# Patient Record
Sex: FEMALE | Race: WHITE
[De-identification: names, ages, dates, MRNs, and addresses within clinical notes are randomized per-mention and may not be internally consistent; named-entity substitution may affect disease eponyms.]

---

## 2021-03-03 DIAGNOSIS — Z23 NEED FOR VACCINATION: ICD-10-CM

## 2021-09-07 ENCOUNTER — HOSPITAL ENCOUNTER (EMERGENCY)
Dept: HOSPITAL 8 - ED | Age: 66
Discharge: HOME | End: 2021-09-07
Payer: MEDICARE

## 2021-09-07 VITALS — SYSTOLIC BLOOD PRESSURE: 146 MMHG | DIASTOLIC BLOOD PRESSURE: 117 MMHG

## 2021-09-07 VITALS — WEIGHT: 187.39 LBS | BODY MASS INDEX: 28.4 KG/M2 | HEIGHT: 68 IN

## 2021-09-07 DIAGNOSIS — E11.9: ICD-10-CM

## 2021-09-07 DIAGNOSIS — K57.32: Primary | ICD-10-CM

## 2021-09-07 DIAGNOSIS — R10.32: ICD-10-CM

## 2021-09-07 LAB
ALBUMIN SERPL-MCNC: 3 G/DL (ref 3.4–5)
ALP SERPL-CCNC: 101 U/L (ref 45–117)
ALT SERPL-CCNC: 21 U/L (ref 12–78)
ANION GAP SERPL CALC-SCNC: 8 MMOL/L (ref 5–15)
BASOPHILS # BLD AUTO: 0.1 X10^3/UL (ref 0–0.1)
BASOPHILS NFR BLD AUTO: 0 % (ref 0–1)
BILIRUB SERPL-MCNC: 0.6 MG/DL (ref 0.2–1)
CALCIUM SERPL-MCNC: 8.4 MG/DL (ref 8.5–10.1)
CHLORIDE SERPL-SCNC: 104 MMOL/L (ref 98–107)
CREAT SERPL-MCNC: 0.75 MG/DL (ref 0.55–1.02)
EOSINOPHIL # BLD AUTO: 0 X10^3/UL (ref 0–0.4)
EOSINOPHIL NFR BLD AUTO: 0 % (ref 1–7)
ERYTHROCYTE [DISTWIDTH] IN BLOOD BY AUTOMATED COUNT: 13.9 % (ref 9.6–15.2)
LYMPHOCYTES # BLD AUTO: 1.5 X10^3/UL (ref 1–3.4)
LYMPHOCYTES NFR BLD AUTO: 12 % (ref 22–44)
MCH RBC QN AUTO: 27.9 PG (ref 27–34.8)
MCHC RBC AUTO-ENTMCNC: 33.2 G/DL (ref 32.4–35.8)
MONOCYTES # BLD AUTO: 0.8 X10^3/UL (ref 0.2–0.8)
MONOCYTES NFR BLD AUTO: 6 % (ref 2–9)
NEUTROPHILS # BLD AUTO: 10.4 X10^3/UL (ref 1.8–6.8)
NEUTROPHILS NFR BLD AUTO: 82 % (ref 42–75)
PLATELET # BLD AUTO: 230 X10^3/UL (ref 130–400)
PMV BLD AUTO: 7.7 FL (ref 7.4–10.4)
PROT SERPL-MCNC: 7 G/DL (ref 6.4–8.2)
RBC # BLD AUTO: 4.79 X10^6/UL (ref 3.82–5.3)
TROPONIN I SERPL-MCNC: < 0.015 NG/ML (ref 0–0.04)

## 2021-09-07 PROCEDURE — 74177 CT ABD & PELVIS W/CONTRAST: CPT

## 2021-09-07 PROCEDURE — 71045 X-RAY EXAM CHEST 1 VIEW: CPT

## 2021-09-07 PROCEDURE — 80053 COMPREHEN METABOLIC PANEL: CPT

## 2021-09-07 PROCEDURE — 99285 EMERGENCY DEPT VISIT HI MDM: CPT

## 2021-09-07 PROCEDURE — 84484 ASSAY OF TROPONIN QUANT: CPT

## 2021-09-07 PROCEDURE — 82800 BLOOD PH: CPT

## 2021-09-07 PROCEDURE — 96360 HYDRATION IV INFUSION INIT: CPT

## 2021-09-07 PROCEDURE — 93005 ELECTROCARDIOGRAM TRACING: CPT

## 2021-09-07 PROCEDURE — 83690 ASSAY OF LIPASE: CPT

## 2021-09-07 PROCEDURE — 36415 COLL VENOUS BLD VENIPUNCTURE: CPT

## 2021-09-07 PROCEDURE — 85025 COMPLETE CBC W/AUTO DIFF WBC: CPT

## 2021-09-07 NOTE — NUR
PT URINATED IN BEDSIDE COMMODE. WAS TOLD NOT TO PUT TOILET PAPER IN COLLECTION 
CUP.

PT STILL PUT TOILET PAPER IN COLLECTION CUP. COTAMINATED SPECIMEN. WILL TRY 
AGAIN FOR UA SOON. NADN. ATTACHED TO MONITORS. VSS.

## 2021-09-07 NOTE — NUR
Patient/Caregiver given discharge instructions and they have confirmed that 
they understand the instructions.  Patient ambulatory with steady gait. NAD, 
all questions answered appropriately, denies additional needs at this time. No 
personal belongings left in room after discharge. PT PASSED ROADTEST. PT ABLE 
TO WALK WITH STEADY GAIT DOWN HALLWAY. PT GIVEN TAXI VOUCHER FOR SAFE DC

## 2021-09-07 NOTE — NUR
BIBA  FROM HOME. PT STATES SHE FELL GETTING OUT OF BED BECAUSE HER LEGS STOPPED 
WORKING. DENIES LOC, HEAD INJURY AND DOES NOT TAKE BLOOD THINNERS. 





REMSA GAVE 500ML FLUIDS.

ATTACHED TO MONITORS, VSS, NADN.  AT BEDSIDE.

EKG AT BEDSIDE.

## 2023-06-11 ENCOUNTER — APPOINTMENT (OUTPATIENT)
Dept: RADIOLOGY | Facility: MEDICAL CENTER | Age: 68
DRG: 871 | End: 2023-06-11
Attending: EMERGENCY MEDICINE
Payer: MEDICARE

## 2023-06-11 ENCOUNTER — HOSPITAL ENCOUNTER (INPATIENT)
Facility: MEDICAL CENTER | Age: 68
LOS: 5 days | DRG: 871 | End: 2023-06-16
Attending: EMERGENCY MEDICINE | Admitting: STUDENT IN AN ORGANIZED HEALTH CARE EDUCATION/TRAINING PROGRAM
Payer: MEDICARE

## 2023-06-11 ENCOUNTER — APPOINTMENT (OUTPATIENT)
Dept: RADIOLOGY | Facility: MEDICAL CENTER | Age: 68
DRG: 871 | End: 2023-06-11
Attending: STUDENT IN AN ORGANIZED HEALTH CARE EDUCATION/TRAINING PROGRAM
Payer: MEDICARE

## 2023-06-11 DIAGNOSIS — N17.9 AKI (ACUTE KIDNEY INJURY) (HCC): ICD-10-CM

## 2023-06-11 DIAGNOSIS — J18.9 PNEUMONIA OF BOTH LUNGS DUE TO INFECTIOUS ORGANISM, UNSPECIFIED PART OF LUNG: ICD-10-CM

## 2023-06-11 DIAGNOSIS — Z79.4 TYPE 2 DIABETES MELLITUS WITH HYPERGLYCEMIA, WITH LONG-TERM CURRENT USE OF INSULIN (HCC): ICD-10-CM

## 2023-06-11 DIAGNOSIS — A41.9 SEPSIS, DUE TO UNSPECIFIED ORGANISM, UNSPECIFIED WHETHER ACUTE ORGAN DYSFUNCTION PRESENT (HCC): ICD-10-CM

## 2023-06-11 DIAGNOSIS — R09.02 HYPOXIA: ICD-10-CM

## 2023-06-11 DIAGNOSIS — N39.0 ACUTE UTI: ICD-10-CM

## 2023-06-11 DIAGNOSIS — E11.65 TYPE 2 DIABETES MELLITUS WITH HYPERGLYCEMIA, WITH LONG-TERM CURRENT USE OF INSULIN (HCC): ICD-10-CM

## 2023-06-11 DIAGNOSIS — J18.9 PNEUMONIA OF LEFT LUNG DUE TO INFECTIOUS ORGANISM, UNSPECIFIED PART OF LUNG: ICD-10-CM

## 2023-06-11 PROBLEM — E11.9 DM (DIABETES MELLITUS) (HCC): Status: ACTIVE | Noted: 2023-06-11

## 2023-06-11 PROBLEM — Z71.89 ACP (ADVANCE CARE PLANNING): Status: ACTIVE | Noted: 2023-06-11

## 2023-06-11 PROBLEM — R65.20 SEVERE SEPSIS (HCC): Status: ACTIVE | Noted: 2023-06-11

## 2023-06-11 LAB
ALBUMIN SERPL BCP-MCNC: 2.9 G/DL (ref 3.2–4.9)
ALBUMIN/GLOB SERPL: 0.8 G/DL
ALP SERPL-CCNC: 88 U/L (ref 30–99)
ALT SERPL-CCNC: 6 U/L (ref 2–50)
ANION GAP SERPL CALC-SCNC: 17 MMOL/L (ref 7–16)
APPEARANCE UR: ABNORMAL
AST SERPL-CCNC: 6 U/L (ref 12–45)
BACTERIA #/AREA URNS HPF: ABNORMAL /HPF
BASOPHILS # BLD AUTO: 0.3 % (ref 0–1.8)
BASOPHILS # BLD: 0.07 K/UL (ref 0–0.12)
BILIRUB SERPL-MCNC: 0.3 MG/DL (ref 0.1–1.5)
BILIRUB UR QL STRIP.AUTO: ABNORMAL
BUN SERPL-MCNC: 46 MG/DL (ref 8–22)
CALCIUM ALBUM COR SERPL-MCNC: 9.5 MG/DL (ref 8.5–10.5)
CALCIUM SERPL-MCNC: 8.6 MG/DL (ref 8.5–10.5)
CHLORIDE SERPL-SCNC: 95 MMOL/L (ref 96–112)
CO2 SERPL-SCNC: 16 MMOL/L (ref 20–33)
COLOR UR: YELLOW
CREAT SERPL-MCNC: 2.91 MG/DL (ref 0.5–1.4)
EOSINOPHIL # BLD AUTO: 0.37 K/UL (ref 0–0.51)
EOSINOPHIL NFR BLD: 1.6 % (ref 0–6.9)
EPI CELLS #/AREA URNS HPF: NEGATIVE /HPF
ERYTHROCYTE [DISTWIDTH] IN BLOOD BY AUTOMATED COUNT: 46.7 FL (ref 35.9–50)
GFR SERPLBLD CREATININE-BSD FMLA CKD-EPI: 17 ML/MIN/1.73 M 2
GLOBULIN SER CALC-MCNC: 3.7 G/DL (ref 1.9–3.5)
GLUCOSE BLD STRIP.AUTO-MCNC: 163 MG/DL (ref 65–99)
GLUCOSE SERPL-MCNC: 193 MG/DL (ref 65–99)
GLUCOSE UR STRIP.AUTO-MCNC: NEGATIVE MG/DL
HCT VFR BLD AUTO: 26.7 % (ref 37–47)
HGB BLD-MCNC: 8.9 G/DL (ref 12–16)
HYALINE CASTS #/AREA URNS LPF: ABNORMAL /LPF
IMM GRANULOCYTES # BLD AUTO: 0.28 K/UL (ref 0–0.11)
IMM GRANULOCYTES NFR BLD AUTO: 1.2 % (ref 0–0.9)
KETONES UR STRIP.AUTO-MCNC: ABNORMAL MG/DL
LACTATE SERPL-SCNC: 1.7 MMOL/L (ref 0.5–2)
LEUKOCYTE ESTERASE UR QL STRIP.AUTO: ABNORMAL
LYMPHOCYTES # BLD AUTO: 2.49 K/UL (ref 1–4.8)
LYMPHOCYTES NFR BLD: 10.7 % (ref 22–41)
MCH RBC QN AUTO: 26.8 PG (ref 27–33)
MCHC RBC AUTO-ENTMCNC: 33.3 G/DL (ref 32.2–35.5)
MCV RBC AUTO: 80.4 FL (ref 81.4–97.8)
MICRO URNS: ABNORMAL
MONOCYTES # BLD AUTO: 1.08 K/UL (ref 0–0.85)
MONOCYTES NFR BLD AUTO: 4.7 % (ref 0–13.4)
NEUTROPHILS # BLD AUTO: 18.93 K/UL (ref 1.82–7.42)
NEUTROPHILS NFR BLD: 81.5 % (ref 44–72)
NITRITE UR QL STRIP.AUTO: NEGATIVE
NRBC # BLD AUTO: 0 K/UL
NRBC BLD-RTO: 0 /100 WBC (ref 0–0.2)
PH UR STRIP.AUTO: 6 [PH] (ref 5–8)
PLATELET # BLD AUTO: 474 K/UL (ref 164–446)
PMV BLD AUTO: 8.4 FL (ref 9–12.9)
POTASSIUM SERPL-SCNC: 4.5 MMOL/L (ref 3.6–5.5)
PROT SERPL-MCNC: 6.6 G/DL (ref 6–8.2)
PROT UR QL STRIP: 100 MG/DL
RBC # BLD AUTO: 3.32 M/UL (ref 4.2–5.4)
RBC # URNS HPF: ABNORMAL /HPF
RBC UR QL AUTO: ABNORMAL
SODIUM SERPL-SCNC: 128 MMOL/L (ref 135–145)
SP GR UR STRIP.AUTO: 1.02
UROBILINOGEN UR STRIP.AUTO-MCNC: 0.2 MG/DL
WBC # BLD AUTO: 23.2 K/UL (ref 4.8–10.8)
WBC #/AREA URNS HPF: ABNORMAL /HPF
YEAST BUDDING URNS QL: PRESENT /HPF

## 2023-06-11 PROCEDURE — 81001 URINALYSIS AUTO W/SCOPE: CPT

## 2023-06-11 PROCEDURE — 99223 1ST HOSP IP/OBS HIGH 75: CPT | Mod: AI,25 | Performed by: STUDENT IN AN ORGANIZED HEALTH CARE EDUCATION/TRAINING PROGRAM

## 2023-06-11 PROCEDURE — 700111 HCHG RX REV CODE 636 W/ 250 OVERRIDE (IP): Performed by: STUDENT IN AN ORGANIZED HEALTH CARE EDUCATION/TRAINING PROGRAM

## 2023-06-11 PROCEDURE — 770000 HCHG ROOM/CARE - INTERMEDIATE ICU *

## 2023-06-11 PROCEDURE — 82962 GLUCOSE BLOOD TEST: CPT

## 2023-06-11 PROCEDURE — 70450 CT HEAD/BRAIN W/O DYE: CPT

## 2023-06-11 PROCEDURE — 700105 HCHG RX REV CODE 258: Performed by: EMERGENCY MEDICINE

## 2023-06-11 PROCEDURE — 96374 THER/PROPH/DIAG INJ IV PUSH: CPT

## 2023-06-11 PROCEDURE — 71045 X-RAY EXAM CHEST 1 VIEW: CPT

## 2023-06-11 PROCEDURE — 80053 COMPREHEN METABOLIC PANEL: CPT

## 2023-06-11 PROCEDURE — 700111 HCHG RX REV CODE 636 W/ 250 OVERRIDE (IP): Performed by: EMERGENCY MEDICINE

## 2023-06-11 PROCEDURE — 87040 BLOOD CULTURE FOR BACTERIA: CPT | Mod: 91

## 2023-06-11 PROCEDURE — 99285 EMERGENCY DEPT VISIT HI MDM: CPT

## 2023-06-11 PROCEDURE — 85025 COMPLETE CBC W/AUTO DIFF WBC: CPT

## 2023-06-11 PROCEDURE — 83605 ASSAY OF LACTIC ACID: CPT

## 2023-06-11 PROCEDURE — 99497 ADVNCD CARE PLAN 30 MIN: CPT | Mod: 25 | Performed by: STUDENT IN AN ORGANIZED HEALTH CARE EDUCATION/TRAINING PROGRAM

## 2023-06-11 PROCEDURE — 87086 URINE CULTURE/COLONY COUNT: CPT

## 2023-06-11 PROCEDURE — 36415 COLL VENOUS BLD VENIPUNCTURE: CPT

## 2023-06-11 RX ORDER — LOSARTAN POTASSIUM 100 MG/1
100 TABLET ORAL DAILY
Status: ON HOLD | COMMUNITY
End: 2023-06-16

## 2023-06-11 RX ORDER — SENNOSIDES A AND B 8.6 MG/1
8.6 TABLET, FILM COATED ORAL 2 TIMES DAILY
COMMUNITY

## 2023-06-11 RX ORDER — SODIUM CHLORIDE 9 MG/ML
INJECTION, SOLUTION INTRAVENOUS CONTINUOUS
Status: DISCONTINUED | OUTPATIENT
Start: 2023-06-11 | End: 2023-06-11

## 2023-06-11 RX ORDER — BUPROPION HYDROCHLORIDE 75 MG/1
150 TABLET ORAL DAILY
Status: ON HOLD | COMMUNITY
End: 2023-06-16

## 2023-06-11 RX ORDER — LANOLIN ALCOHOL/MO/W.PET/CERES
3 CREAM (GRAM) TOPICAL
COMMUNITY

## 2023-06-11 RX ORDER — INSULIN LISPRO 100 [IU]/ML
0.2 INJECTION, SOLUTION INTRAVENOUS; SUBCUTANEOUS
Status: DISCONTINUED | OUTPATIENT
Start: 2023-06-12 | End: 2023-06-12

## 2023-06-11 RX ORDER — CEFTRIAXONE 2 G/1
2000 INJECTION, POWDER, FOR SOLUTION INTRAMUSCULAR; INTRAVENOUS ONCE
Status: COMPLETED | OUTPATIENT
Start: 2023-06-11 | End: 2023-06-11

## 2023-06-11 RX ORDER — INSULIN GLARGINE 100 [IU]/ML
28 INJECTION, SOLUTION SUBCUTANEOUS DAILY
Status: ON HOLD | COMMUNITY
End: 2023-06-16 | Stop reason: SDUPTHER

## 2023-06-11 RX ORDER — AMLODIPINE BESYLATE 5 MG/1
5 TABLET ORAL DAILY
COMMUNITY

## 2023-06-11 RX ORDER — OMEPRAZOLE 20 MG/1
20 CAPSULE, DELAYED RELEASE ORAL DAILY
COMMUNITY

## 2023-06-11 RX ORDER — HEPARIN SODIUM 5000 [USP'U]/ML
5000 INJECTION, SOLUTION INTRAVENOUS; SUBCUTANEOUS EVERY 8 HOURS
COMMUNITY

## 2023-06-11 RX ORDER — SULFAMETHOXAZOLE AND TRIMETHOPRIM 800; 160 MG/1; MG/1
1 TABLET ORAL 2 TIMES DAILY
Status: ON HOLD | COMMUNITY
End: 2023-06-16

## 2023-06-11 RX ORDER — SODIUM CHLORIDE, SODIUM LACTATE, POTASSIUM CHLORIDE, AND CALCIUM CHLORIDE .6; .31; .03; .02 G/100ML; G/100ML; G/100ML; G/100ML
30 INJECTION, SOLUTION INTRAVENOUS ONCE
Status: COMPLETED | OUTPATIENT
Start: 2023-06-11 | End: 2023-06-11

## 2023-06-11 RX ORDER — ACETAMINOPHEN 325 MG/1
650 TABLET ORAL EVERY 6 HOURS PRN
Status: DISCONTINUED | OUTPATIENT
Start: 2023-06-11 | End: 2023-06-16 | Stop reason: HOSPADM

## 2023-06-11 RX ORDER — ENOXAPARIN SODIUM 100 MG/ML
40 INJECTION SUBCUTANEOUS DAILY
Status: DISCONTINUED | OUTPATIENT
Start: 2023-06-11 | End: 2023-06-11

## 2023-06-11 RX ORDER — HEPARIN SODIUM 5000 [USP'U]/ML
5000 INJECTION, SOLUTION INTRAVENOUS; SUBCUTANEOUS EVERY 8 HOURS
Status: DISCONTINUED | OUTPATIENT
Start: 2023-06-11 | End: 2023-06-13

## 2023-06-11 RX ORDER — FUROSEMIDE 10 MG/ML
20 INJECTION INTRAMUSCULAR; INTRAVENOUS ONCE
Status: COMPLETED | OUTPATIENT
Start: 2023-06-11 | End: 2023-06-11

## 2023-06-11 RX ORDER — CEFDINIR 300 MG/1
300 CAPSULE ORAL 2 TIMES DAILY
Status: ON HOLD | COMMUNITY
End: 2023-06-16

## 2023-06-11 RX ORDER — INSULIN LISPRO 100 [IU]/ML
1-6 INJECTION, SOLUTION INTRAVENOUS; SUBCUTANEOUS
Status: DISCONTINUED | OUTPATIENT
Start: 2023-06-12 | End: 2023-06-12

## 2023-06-11 RX ORDER — UREA 10 %
3 LOTION (ML) TOPICAL
Status: DISCONTINUED | OUTPATIENT
Start: 2023-06-11 | End: 2023-06-16 | Stop reason: HOSPADM

## 2023-06-11 RX ORDER — INSULIN ASPART 100 [IU]/ML
2-12 INJECTION, SOLUTION INTRAVENOUS; SUBCUTANEOUS
COMMUNITY

## 2023-06-11 RX ORDER — BUPROPION HYDROCHLORIDE 75 MG/1
150 TABLET ORAL DAILY
Status: DISCONTINUED | OUTPATIENT
Start: 2023-06-12 | End: 2023-06-16 | Stop reason: HOSPADM

## 2023-06-11 RX ORDER — ACETAMINOPHEN 325 MG/1
650 TABLET ORAL EVERY 4 HOURS PRN
COMMUNITY

## 2023-06-11 RX ADMIN — SODIUM CHLORIDE, POTASSIUM CHLORIDE, SODIUM LACTATE AND CALCIUM CHLORIDE 2517 ML: 600; 310; 30; 20 INJECTION, SOLUTION INTRAVENOUS at 18:07

## 2023-06-11 RX ADMIN — FUROSEMIDE 20 MG: 10 INJECTION, SOLUTION INTRAVENOUS at 22:08

## 2023-06-11 RX ADMIN — HEPARIN SODIUM 5000 UNITS: 5000 INJECTION, SOLUTION INTRAVENOUS; SUBCUTANEOUS at 21:48

## 2023-06-11 RX ADMIN — CEFTRIAXONE SODIUM 2000 MG: 2 INJECTION, POWDER, FOR SOLUTION INTRAMUSCULAR; INTRAVENOUS at 18:15

## 2023-06-11 ASSESSMENT — PAIN DESCRIPTION - PAIN TYPE: TYPE: ACUTE PAIN

## 2023-06-11 ASSESSMENT — COGNITIVE AND FUNCTIONAL STATUS - GENERAL
TOILETING: A LITTLE
MOVING TO AND FROM BED TO CHAIR: A LOT
HELP NEEDED FOR BATHING: A LITTLE
DRESSING REGULAR LOWER BODY CLOTHING: A LITTLE
DAILY ACTIVITIY SCORE: 20
STANDING UP FROM CHAIR USING ARMS: A LOT
CLIMB 3 TO 5 STEPS WITH RAILING: A LOT
SUGGESTED CMS G CODE MODIFIER DAILY ACTIVITY: CJ
MOBILITY SCORE: 14
MOVING FROM LYING ON BACK TO SITTING ON SIDE OF FLAT BED: A LITTLE
DRESSING REGULAR UPPER BODY CLOTHING: A LITTLE
WALKING IN HOSPITAL ROOM: A LOT
SUGGESTED CMS G CODE MODIFIER MOBILITY: CL
TURNING FROM BACK TO SIDE WHILE IN FLAT BAD: A LITTLE

## 2023-06-11 ASSESSMENT — LIFESTYLE VARIABLES
EVER HAD A DRINK FIRST THING IN THE MORNING TO STEADY YOUR NERVES TO GET RID OF A HANGOVER: NO
HOW MANY TIMES IN THE PAST YEAR HAVE YOU HAD 5 OR MORE DRINKS IN A DAY: 0
TOTAL SCORE: 0
CONSUMPTION TOTAL: NEGATIVE
HAVE PEOPLE ANNOYED YOU BY CRITICIZING YOUR DRINKING: NO
ALCOHOL_USE: NO
AVERAGE NUMBER OF DAYS PER WEEK YOU HAVE A DRINK CONTAINING ALCOHOL: 0
ON A TYPICAL DAY WHEN YOU DRINK ALCOHOL HOW MANY DRINKS DO YOU HAVE: 0
DOES PATIENT WANT TO STOP DRINKING: NO
HAVE YOU EVER FELT YOU SHOULD CUT DOWN ON YOUR DRINKING: NO
EVER FELT BAD OR GUILTY ABOUT YOUR DRINKING: NO

## 2023-06-11 ASSESSMENT — ENCOUNTER SYMPTOMS
NEUROLOGICAL NEGATIVE: 1
RESPIRATORY NEGATIVE: 1
CHILLS: 1
EYES NEGATIVE: 1
PSYCHIATRIC NEGATIVE: 1
CARDIOVASCULAR NEGATIVE: 1
MUSCULOSKELETAL NEGATIVE: 1

## 2023-06-11 ASSESSMENT — PATIENT HEALTH QUESTIONNAIRE - PHQ9
SUM OF ALL RESPONSES TO PHQ9 QUESTIONS 1 AND 2: 0
1. LITTLE INTEREST OR PLEASURE IN DOING THINGS: NOT AT ALL
2. FEELING DOWN, DEPRESSED, IRRITABLE, OR HOPELESS: NOT AT ALL

## 2023-06-11 ASSESSMENT — FIBROSIS 4 INDEX
FIB4 SCORE: 0.67
FIB4 SCORE: 0.35

## 2023-06-12 ENCOUNTER — APPOINTMENT (OUTPATIENT)
Dept: CARDIOLOGY | Facility: MEDICAL CENTER | Age: 68
DRG: 871 | End: 2023-06-12
Attending: INTERNAL MEDICINE
Payer: MEDICARE

## 2023-06-12 PROBLEM — J96.01 ACUTE RESPIRATORY FAILURE WITH HYPOXIA (HCC): Status: ACTIVE | Noted: 2023-06-12

## 2023-06-12 LAB
ANION GAP SERPL CALC-SCNC: 16 MMOL/L (ref 7–16)
BASOPHILS # BLD AUTO: 0.3 % (ref 0–1.8)
BASOPHILS # BLD: 0.05 K/UL (ref 0–0.12)
BUN SERPL-MCNC: 36 MG/DL (ref 8–22)
CALCIUM SERPL-MCNC: 8.6 MG/DL (ref 8.5–10.5)
CHLORIDE SERPL-SCNC: 102 MMOL/L (ref 96–112)
CO2 SERPL-SCNC: 18 MMOL/L (ref 20–33)
CREAT SERPL-MCNC: 1.77 MG/DL (ref 0.5–1.4)
EOSINOPHIL # BLD AUTO: 0.39 K/UL (ref 0–0.51)
EOSINOPHIL NFR BLD: 2 % (ref 0–6.9)
ERYTHROCYTE [DISTWIDTH] IN BLOOD BY AUTOMATED COUNT: 48.2 FL (ref 35.9–50)
GFR SERPLBLD CREATININE-BSD FMLA CKD-EPI: 31 ML/MIN/1.73 M 2
GLUCOSE BLD STRIP.AUTO-MCNC: 170 MG/DL (ref 65–99)
GLUCOSE BLD STRIP.AUTO-MCNC: 216 MG/DL (ref 65–99)
GLUCOSE BLD STRIP.AUTO-MCNC: 229 MG/DL (ref 65–99)
GLUCOSE SERPL-MCNC: 179 MG/DL (ref 65–99)
HCT VFR BLD AUTO: 25.9 % (ref 37–47)
HGB BLD-MCNC: 8.3 G/DL (ref 12–16)
IMM GRANULOCYTES # BLD AUTO: 0.29 K/UL (ref 0–0.11)
IMM GRANULOCYTES NFR BLD AUTO: 1.5 % (ref 0–0.9)
LV EJECT FRACT  99904: 70
LV EJECT FRACT MOD 2C 99903: 67.98
LV EJECT FRACT MOD 4C 99902: 68.26
LV EJECT FRACT MOD BP 99901: 67.57
LYMPHOCYTES # BLD AUTO: 1.86 K/UL (ref 1–4.8)
LYMPHOCYTES NFR BLD: 9.4 % (ref 22–41)
MCH RBC QN AUTO: 26.6 PG (ref 27–33)
MCHC RBC AUTO-ENTMCNC: 32 G/DL (ref 32.2–35.5)
MCV RBC AUTO: 83 FL (ref 81.4–97.8)
MONOCYTES # BLD AUTO: 1 K/UL (ref 0–0.85)
MONOCYTES NFR BLD AUTO: 5.1 % (ref 0–13.4)
NEUTROPHILS # BLD AUTO: 16.19 K/UL (ref 1.82–7.42)
NEUTROPHILS NFR BLD: 81.7 % (ref 44–72)
NRBC # BLD AUTO: 0 K/UL
NRBC BLD-RTO: 0 /100 WBC (ref 0–0.2)
PLATELET # BLD AUTO: 470 K/UL (ref 164–446)
PMV BLD AUTO: 8.8 FL (ref 9–12.9)
POTASSIUM SERPL-SCNC: 4.3 MMOL/L (ref 3.6–5.5)
RBC # BLD AUTO: 3.12 M/UL (ref 4.2–5.4)
SODIUM SERPL-SCNC: 136 MMOL/L (ref 135–145)
WBC # BLD AUTO: 19.8 K/UL (ref 4.8–10.8)

## 2023-06-12 PROCEDURE — 700111 HCHG RX REV CODE 636 W/ 250 OVERRIDE (IP): Performed by: STUDENT IN AN ORGANIZED HEALTH CARE EDUCATION/TRAINING PROGRAM

## 2023-06-12 PROCEDURE — 97162 PT EVAL MOD COMPLEX 30 MIN: CPT

## 2023-06-12 PROCEDURE — 700105 HCHG RX REV CODE 258: Performed by: INTERNAL MEDICINE

## 2023-06-12 PROCEDURE — 85025 COMPLETE CBC W/AUTO DIFF WBC: CPT

## 2023-06-12 PROCEDURE — 700111 HCHG RX REV CODE 636 W/ 250 OVERRIDE (IP): Performed by: INTERNAL MEDICINE

## 2023-06-12 PROCEDURE — A9270 NON-COVERED ITEM OR SERVICE: HCPCS | Performed by: STUDENT IN AN ORGANIZED HEALTH CARE EDUCATION/TRAINING PROGRAM

## 2023-06-12 PROCEDURE — 700102 HCHG RX REV CODE 250 W/ 637 OVERRIDE(OP): Performed by: INTERNAL MEDICINE

## 2023-06-12 PROCEDURE — 307059 PAD,EAR PROTECTOR: Performed by: INTERNAL MEDICINE

## 2023-06-12 PROCEDURE — 92612 ENDOSCOPY SWALLOW (FEES) VID: CPT

## 2023-06-12 PROCEDURE — 700102 HCHG RX REV CODE 250 W/ 637 OVERRIDE(OP): Performed by: STUDENT IN AN ORGANIZED HEALTH CARE EDUCATION/TRAINING PROGRAM

## 2023-06-12 PROCEDURE — 770000 HCHG ROOM/CARE - INTERMEDIATE ICU *

## 2023-06-12 PROCEDURE — 99233 SBSQ HOSP IP/OBS HIGH 50: CPT | Performed by: INTERNAL MEDICINE

## 2023-06-12 PROCEDURE — 93306 TTE W/DOPPLER COMPLETE: CPT

## 2023-06-12 PROCEDURE — 92610 EVALUATE SWALLOWING FUNCTION: CPT

## 2023-06-12 PROCEDURE — 82962 GLUCOSE BLOOD TEST: CPT | Mod: 91

## 2023-06-12 PROCEDURE — 80048 BASIC METABOLIC PNL TOTAL CA: CPT

## 2023-06-12 PROCEDURE — 93306 TTE W/DOPPLER COMPLETE: CPT | Mod: 26 | Performed by: INTERNAL MEDICINE

## 2023-06-12 RX ORDER — ONDANSETRON 4 MG/1
4 TABLET, ORALLY DISINTEGRATING ORAL EVERY 4 HOURS PRN
Status: DISCONTINUED | OUTPATIENT
Start: 2023-06-12 | End: 2023-06-16 | Stop reason: HOSPADM

## 2023-06-12 RX ORDER — INSULIN LISPRO 100 [IU]/ML
1-6 INJECTION, SOLUTION INTRAVENOUS; SUBCUTANEOUS
Status: DISCONTINUED | OUTPATIENT
Start: 2023-06-12 | End: 2023-06-16 | Stop reason: HOSPADM

## 2023-06-12 RX ORDER — ONDANSETRON 2 MG/ML
4 INJECTION INTRAMUSCULAR; INTRAVENOUS EVERY 4 HOURS PRN
Status: DISCONTINUED | OUTPATIENT
Start: 2023-06-12 | End: 2023-06-16 | Stop reason: HOSPADM

## 2023-06-12 RX ORDER — INSULIN LISPRO 100 [IU]/ML
0.2 INJECTION, SOLUTION INTRAVENOUS; SUBCUTANEOUS
Status: DISCONTINUED | OUTPATIENT
Start: 2023-06-12 | End: 2023-06-16 | Stop reason: HOSPADM

## 2023-06-12 RX ADMIN — HEPARIN SODIUM 5000 UNITS: 5000 INJECTION, SOLUTION INTRAVENOUS; SUBCUTANEOUS at 21:06

## 2023-06-12 RX ADMIN — INSULIN GLARGINE-YFGN 12 UNITS: 100 INJECTION, SOLUTION SUBCUTANEOUS at 18:38

## 2023-06-12 RX ADMIN — INSULIN LISPRO 2 UNITS: 100 INJECTION, SOLUTION INTRAVENOUS; SUBCUTANEOUS at 18:40

## 2023-06-12 RX ADMIN — INSULIN LISPRO 6 UNITS: 100 INJECTION, SOLUTION INTRAVENOUS; SUBCUTANEOUS at 18:37

## 2023-06-12 RX ADMIN — HEPARIN SODIUM 5000 UNITS: 5000 INJECTION, SOLUTION INTRAVENOUS; SUBCUTANEOUS at 14:43

## 2023-06-12 RX ADMIN — Medication 3 MG: at 21:05

## 2023-06-12 RX ADMIN — AMPICILLIN AND SULBACTAM 3 G: 1; 2 INJECTION, POWDER, FOR SOLUTION INTRAMUSCULAR; INTRAVENOUS at 23:57

## 2023-06-12 RX ADMIN — ACETAMINOPHEN 650 MG: 325 TABLET, FILM COATED ORAL at 21:10

## 2023-06-12 RX ADMIN — AMPICILLIN AND SULBACTAM 3 G: 1; 2 INJECTION, POWDER, FOR SOLUTION INTRAMUSCULAR; INTRAVENOUS at 18:34

## 2023-06-12 RX ADMIN — ONDANSETRON 4 MG: 2 INJECTION INTRAMUSCULAR; INTRAVENOUS at 05:06

## 2023-06-12 RX ADMIN — HEPARIN SODIUM 5000 UNITS: 5000 INJECTION, SOLUTION INTRAVENOUS; SUBCUTANEOUS at 05:06

## 2023-06-12 ASSESSMENT — PAIN DESCRIPTION - PAIN TYPE
TYPE: ACUTE PAIN

## 2023-06-12 ASSESSMENT — COGNITIVE AND FUNCTIONAL STATUS - GENERAL
WALKING IN HOSPITAL ROOM: A LOT
MOVING TO AND FROM BED TO CHAIR: UNABLE
MOVING FROM LYING ON BACK TO SITTING ON SIDE OF FLAT BED: UNABLE
TURNING FROM BACK TO SIDE WHILE IN FLAT BAD: A LOT
CLIMB 3 TO 5 STEPS WITH RAILING: TOTAL
MOBILITY SCORE: 9
STANDING UP FROM CHAIR USING ARMS: A LOT
SUGGESTED CMS G CODE MODIFIER MOBILITY: CM

## 2023-06-12 ASSESSMENT — ENCOUNTER SYMPTOMS
NAUSEA: 0
FOCAL WEAKNESS: 0
MYALGIAS: 0
PHOTOPHOBIA: 0
DIZZINESS: 0
DOUBLE VISION: 0
TREMORS: 0
SHORTNESS OF BREATH: 1
HALLUCINATIONS: 0
SPUTUM PRODUCTION: 1
SPEECH CHANGE: 0
VOMITING: 0
PALPITATIONS: 0
COUGH: 1
ORTHOPNEA: 0
ABDOMINAL PAIN: 0
CHILLS: 0
EYE PAIN: 0
FEVER: 0
DIARRHEA: 0
WEIGHT LOSS: 0
NECK PAIN: 0
BLURRED VISION: 0
CONSTIPATION: 0
HEADACHES: 0
BACK PAIN: 0
TINGLING: 0
SENSORY CHANGE: 0

## 2023-06-12 ASSESSMENT — GAIT ASSESSMENTS: GAIT LEVEL OF ASSIST: UNABLE TO PARTICIPATE

## 2023-06-12 ASSESSMENT — LIFESTYLE VARIABLES: SUBSTANCE_ABUSE: 0

## 2023-06-12 NOTE — ASSESSMENT & PLAN NOTE
Secondary to pneumonia  Supplemental oxygen at 6 liters to maintain adequate sats.  Echocardiogram is normal.

## 2023-06-12 NOTE — PROGRESS NOTES
This patient will be admitted to the [facility] for severe sepsis and hypoxia and remains under the care of hospitalist (who all questions and concerns should be relayed to).  While a critical care consultation has not been requested, we are immediately available if the patient requires critical care in the future.

## 2023-06-12 NOTE — ED NOTES
Bedside report from Faye BUTCHER.  Pt back from CT, connected to VS monitor and wall oxygen at 4L NC.

## 2023-06-12 NOTE — ED NOTES
Pt removed NRB mask x2, saturation dropped to 84%. NRB mask placed back on pt, saturation improved to 94%. Pt verbalized understanding that oxygen and monitoring equipment must stay in place for pt's safety.

## 2023-06-12 NOTE — WOUND TEAM
In to see pt for almost resolved incision to R medial foot and ankle. It is approximated with 0.4 x 0.5 adherent scab. With assistance from Nsg pt stood from chair using FWW so that sacrococcygeal area could be assessed. She has red buttocks that mel. Redness is also proximal on sacrococcygeal area. It is blanching. Discussed with RN having pt side to side mostly with minimal time supine since pt is incontinent and unable to use sacral adhesive foam for protection. Barrier paste to be in use to protect skin. If pt unable to state when she has to urinate consider using Purewick. Per RN it was tried last night and was unsuccessful.       INTERVENTIONS  CURRENTLY IN PLACE (X), APPLIED THIS VISIT (A), ORDERED (O):   Q shift Jovani:  X  Q shift pressure point assessments:  X    Surface/Positioning   Standard Mattress/Trauma Bed           Low Airloss          ICU Low Airloss x  Bariatric MERCEDES     Waffle cushion        Waffle Overlay          Reposition q 2 hours   x   TAPs Turning system     Z Del Pillow     Offloading/Redistribution   Sacral Offloading Dressing (Silicone dressing)     Heel Offloading Dressing (Silicone dressing)    o     Heel float boots (Prevalon boot)             Float Heels off Bed with Pillows           Respiratory  Oxymask  Silicone O2 tubing         Gray Foam Ear protectors   o  Cannula fixation Device (Tender )          High flow offloading Clip    Elastic head band offloading device      Anchorfast                                                         Trach with Optifoam split foam             Containment/Moisture Prevention     Rectal tube or BMS    Purwick/Condom Cath        Daniels Catheter    Barrier wipes    x       Barrier paste    x   Antifungal tx      Interdry        Mobilization       Up to chair    x    Ambulate      PT/OT  x    Nutrition       Dietician        Diabetes Education      PO  x   TF     TPN     NPO   # days     Other  mildly  thick, minced    Anticipated discharge  plans: TBD was at [facility]  LTACH:        SNF/Rehab:                  Home Health Care:           Outpatient Wound Center:            Self/Family Care:        Other:

## 2023-06-12 NOTE — CARE PLAN
The patient is Watcher - Medium risk of patient condition declining or worsening    Shift Goals  Clinical Goals: decrease O2 demands, improve breathing  Patient Goals: kendrick  Family Goals: kendrick    Progress made toward(s) clinical / shift goals:    Problem: Knowledge Deficit - Standard  Goal: Patient and family/care givers will demonstrate understanding of plan of care, disease process/condition, diagnostic tests and medications  Outcome: Progressing     Problem: Fluid Volume  Goal: Fluid volume balance will be maintained  Outcome: Progressing     Problem: Skin Integrity  Goal: Skin integrity is maintained or improved  Outcome: Progressing     Problem: Fall Risk  Goal: Patient will remain free from falls  Outcome: Progressing       Patient is not progressing towards the following goals:

## 2023-06-12 NOTE — ASSESSMENT & PLAN NOTE
With hyperglycemia.  Last HbA1c in December was 11.2 and now is 7.5  Continue seeing sliding scale with hypoglycemia protocol.  She had been on Lantus 28 units of Lantus prior to admission which was reduced due to poor oral intake and will be increased as tolerated.  Continue to monitor.

## 2023-06-12 NOTE — ASSESSMENT & PLAN NOTE
Admitting hospitalist discussed CODE STATUS with patient     I discussed advance care planning with the patient for at least 16 minutes, including diagnosis, prognosis, plan of care, risks and benefits of any therapies that could be offered, as well as alternatives including palliation and hospice, as appropriate.     Full code

## 2023-06-12 NOTE — ED PROVIDER NOTES
ED Provider Note    CHIEF COMPLAINT  Chief Complaint   Patient presents with    UTI     From [facility] with symptoms of UTI not currently on antibiotics, dx with UTI at [facility] x2 days ago, hx chronic UTI    Hypotension     86/54 with EMS, 300 LR given en route    ALOC     Aox2 currently, baseline Aox4        EXTERNAL RECORDS REVIEWED  Inpatient Notes the patient was seen at [facility] 4/8/2023 for diagnosis of osteomyelitis and complication of central venous catheter he was seen at [facility] and urinary 2023 for an ankle fracture    HPI/ROS  LIMITATION TO HISTORY   Select: Altered mental status / Confusion  OUTSIDE HISTORIAN(S):  none    Claudine Augustine is a 67 y.o. female who presents by ambulance with low blood pressure and signs of sepsis and altered mental status.  The patient is living at [facility].  She is an insulin-dependent diabetic.  She was seen 2 days ago at [facility] and diagnosed with a urinary tract infection.  From her previous records she appears had a right ankle fracture and you are a 2023 and developed right ankle and foot osteomyelitis 3/1/2023 which she was placed on vancomycin.  She is prescribed Omnicef for her urinary tract infection last month but has not been on antibiotics since her diagnosis of UTI at [facility] 2 days ago.  The patient since her visit to [facility] has become more weak lethargic and confused.  She is unable to give a history because of her confusion.    PAST MEDICAL HISTORY       SURGICAL HISTORY  patient denies any surgical history    FAMILY HISTORY  History reviewed. No pertinent family history.    SOCIAL HISTORY  Social History     Tobacco Use    Smoking status: Former     Types: Cigarettes    Smokeless tobacco: Never   Vaping Use    Vaping Use: Never used   Substance and Sexual Activity    Alcohol use: Not Currently    Drug use: Never    Sexual activity: Not on file  "      CURRENT MEDICATIONS  Home Medications       Reviewed by Justin Razo (Pharmacy Tech) on 06/11/23 at 1918  Med List Status: Complete     Medication Last Dose Status   acetaminophen (TYLENOL) 325 MG Tab 5/26/2023 Active   amLODIPine (NORVASC) 5 MG Tab 6/11/2023 Active   buPROPion (WELLBUTRIN) 75 MG Tab 6/10/2023 Active   cefdinir (OMNICEF) 300 MG Cap 5/13/2023 Active   heparin 5000 UNIT/ML Solution 6/11/2023 Active   insulin aspart (NOVOLOG) 100 UNIT/ML Solution 6/11/2023 Active   insulin glargine 100 UNIT/ML SC SOLN 6/11/2023 Active   losartan (COZAAR) 100 MG Tab 6/11/2023 Active   melatonin 3 MG Tab 6/9/2023 Active   metFORMIN (GLUCOPHAGE) 500 MG Tab 6/11/2023 Active   omeprazole (PRILOSEC) 20 MG delayed-release capsule 6/11/2023 Active   sennosides (SENOKOT) 8.6 MG Tab 6/11/2023 Active   sulfamethoxazole-trimethoprim (BACTRIM DS) 800-160 MG tablet 5/15/2023 Active                    ALLERGIES  No Known Allergies    PHYSICAL EXAM  VITAL SIGNS: BP (!) 89/51   Pulse 100   Temp 36.6 °C (97.8 °F) (Oral)   Resp (!) 25   Ht 1.727 m (5' 8\")   Wt 83.9 kg (185 lb)   SpO2 90%   BMI 28.13 kg/m²      Constitutional: Well developed, pale and ill-appearing  HEENT: Normocephalic, Atraumatic,  external ears normal, pharynx pink,  Mucous  Membranes moist, No rhinorrhea or mucosal edema  Eyes: PERRL, EOMI, Conjunctiva normal, No discharge.   Neck: Normal range of motion, No tenderness, Supple, No stridor.   Lymphatic: No lymphadenopathy    Cardiovascular: Regular Rate and Rhythm, No murmurs,  rubs, or gallops.   Thorax & Lungs: Lungs clear to auscultation bilaterally, No respiratory distress, No wheezes, rhales or rhonchi, No chest wall tenderness.   Abdomen: Bowel sounds normal, Soft, non tender, non distended,  No pulsatile masses., no rebound guarding or peritoneal signs.   Skin: Warm, Dry, No erythema, No rash,   Back:  No CVA tenderness,  No spinal tenderness, bony crepitance step offs or instability. "   Extremities: Equal, intact distal pulses, No cyanosis, clubbing or edema,  No tenderness.   Musculoskeletal: Good range of motion in all major joints. No tenderness to palpation or major deformities noted.   Neurologic: Alert & oriented x 1,,  No focal deficits noted.   Psychiatric: Affect normal, Judgment normal, Mood normal.       DIAGNOSTIC STUDIES / PROCEDURES  EKG  I have independently interpreted this EKG  See below    LABS  Results for orders placed or performed during the hospital encounter of 06/11/23   Lactic acid (lactate): Repeat if initial lactic acid result is greater than 2   Result Value Ref Range    Lactic Acid 1.7 0.5 - 2.0 mmol/L   CBC With Differential   Result Value Ref Range    WBC 23.2 (H) 4.8 - 10.8 K/uL    RBC 3.32 (L) 4.20 - 5.40 M/uL    Hemoglobin 8.9 (L) 12.0 - 16.0 g/dL    Hematocrit 26.7 (L) 37.0 - 47.0 %    MCV 80.4 (L) 81.4 - 97.8 fL    MCH 26.8 (L) 27.0 - 33.0 pg    MCHC 33.3 32.2 - 35.5 g/dL    RDW 46.7 35.9 - 50.0 fL    Platelet Count 474 (H) 164 - 446 K/uL    MPV 8.4 (L) 9.0 - 12.9 fL    Neutrophils-Polys 81.50 (H) 44.00 - 72.00 %    Lymphocytes 10.70 (L) 22.00 - 41.00 %    Monocytes 4.70 0.00 - 13.40 %    Eosinophils 1.60 0.00 - 6.90 %    Basophils 0.30 0.00 - 1.80 %    Immature Granulocytes 1.20 (H) 0.00 - 0.90 %    Nucleated RBC 0.00 0.00 - 0.20 /100 WBC    Neutrophils (Absolute) 18.93 (H) 1.82 - 7.42 K/uL    Lymphs (Absolute) 2.49 1.00 - 4.80 K/uL    Monos (Absolute) 1.08 (H) 0.00 - 0.85 K/uL    Eos (Absolute) 0.37 0.00 - 0.51 K/uL    Baso (Absolute) 0.07 0.00 - 0.12 K/uL    Immature Granulocytes (abs) 0.28 (H) 0.00 - 0.11 K/uL    NRBC (Absolute) 0.00 K/uL   Comp Metabolic Panel   Result Value Ref Range    Sodium 128 (L) 135 - 145 mmol/L    Potassium 4.5 3.6 - 5.5 mmol/L    Chloride 95 (L) 96 - 112 mmol/L    Co2 16 (L) 20 - 33 mmol/L    Anion Gap 17.0 (H) 7.0 - 16.0    Glucose 193 (H) 65 - 99 mg/dL    Bun 46 (H) 8 - 22 mg/dL    Creatinine 2.91 (H) 0.50 - 1.40 mg/dL     Calcium 8.6 8.5 - 10.5 mg/dL    AST(SGOT) 6 (L) 12 - 45 U/L    ALT(SGPT) 6 2 - 50 U/L    Alkaline Phosphatase 88 30 - 99 U/L    Total Bilirubin 0.3 0.1 - 1.5 mg/dL    Albumin 2.9 (L) 3.2 - 4.9 g/dL    Total Protein 6.6 6.0 - 8.2 g/dL    Globulin 3.7 (H) 1.9 - 3.5 g/dL    A-G Ratio 0.8 g/dL   Urinalysis    Specimen: Urine   Result Value Ref Range    Color Yellow     Character Cloudy (A)     Specific Gravity 1.025 <1.035    Ph 6.0 5.0 - 8.0    Glucose Negative Negative mg/dL    Ketones Trace (A) Negative mg/dL    Protein 100 (A) Negative mg/dL    Bilirubin Small (A) Negative    Urobilinogen, Urine 0.2 Negative    Nitrite Negative Negative    Leukocyte Esterase Large (A) Negative    Occult Blood Large (A) Negative    Micro Urine Req Microscopic    CORRECTED CALCIUM   Result Value Ref Range    Correct Calcium 9.5 8.5 - 10.5 mg/dL   ESTIMATED GFR   Result Value Ref Range    GFR (CKD-EPI) 17 (A) >60 mL/min/1.73 m 2   URINE MICROSCOPIC (W/UA)   Result Value Ref Range    -150 (A) /hpf    RBC 2-5 (A) /hpf    Bacteria Few (A) None /hpf    Epithelial Cells Negative /hpf    Hyaline Cast 0-2 /lpf    Budding Yeast Present (A) Absent /hpf        RADIOLOGY  I have independently interpreted the diagnostic imaging associated with this visit and am waiting the final reading from the radiologist.   My preliminary interpretation is as follows: Chest x-ray: Positive left lower lobe infiltrate  Radiologist interpretation:   CT-HEAD W/O   Final Result      1.  No acute intracranial process.   2.  There is diffuse cerebral atrophy.   3.  There is low attenuation in the periventricular white matter most consistent with chronic small vessel ischemic change, although gliosis and demyelination could have this appearance.         DX-CHEST-PORTABLE (1 VIEW)   Final Result      1.  Diffuse ill-defined right greater than left interstitial opacities suspicious for pneumonitis favored over edema.            COURSE & MEDICAL DECISION  MAKING    ED Observation Status? No; Patient does not meet criteria for ED Observation.     INITIAL ASSESSMENT, COURSE AND PLAN  Care Narrative: This is a 67-year-old female who is currently at [facility] and has a history of osteomyelitis of the right ankle diabetes and hypertension who was seen at [facility] 2 days ago and diagnosed with urinary tract infection but not started on any antibiotics.  She was brought here to the hospital tachycardic and hypotensive with altered mental status.  I have ordered IV fluids and antibiotics per the sepsis protocol and a CT head to evaluate for intracranial pathology.  Her blood sugar checked by the ambulance was 243.    Differential diagnosis: Sepsis secondary to urinary tract infection versus pneumonia, intracranial hemorrhage, DKA, electrolyte disturbance      HYDRATION: Based on the patient's presentation of Sepsis the patient was given IV fluids. IV Hydration was used because oral hydration was not adequate alone. Upon recheck following hydration, the patient was improved.      ADDITIONAL PROBLEM LIST  Diabetes  Hypertension  GERD  Depression  DISPOSITION AND DISCUSSIONS    Patient's white blood cell count is markedly elevated at 23.2 hemoglobin is low at 8.9 with platelet count 474.  She does have 1.2 immature granulocytes.  Sodium low at 128 potassium normal at 4.5 CO2 is low at 16 with an anion gap elevated at 17.  Glucose is 193.  BUN 46 creatinine 2.91 liver function tests are normal.  Patient's initial lactic acid is 1.7.  Her urine was very cloudy appearing and I suspect it will be infected.  Chest x-ray is concerning for interstitial pneumonitis.  She does have a cough and hypoxia at 91% on room air.  She was given IV fluids and antibiotics per the sepsis protocol and will be admitted to the IMCU for treatment of sepsis.  To the IV fluids her blood sugar as well as her mentation has improved.  CT head does not show any intracranial  "hemorrhage.    /58   Pulse (!) 107   Temp 36.6 °C (97.8 °F) (Oral)   Resp (!) 27   Ht 1.727 m (5' 8\")   Wt 83.9 kg (185 lb)   SpO2 90%   BMI 28.13 kg/m²     The patient has improved blood pressure after IV fluids but now is more hypoxic satting 89%.  We have increased her oxygen to 6 L and she is still hypoxic.  She is probably more of an ICU candidate and IMCU candidate.  Have spoken with Dr. Gonda who will come to the emergency department to evaluate her and decide she will be admitted to the IMCU versus ICU.    Dr. Gonda evaluated the patient and she is still stable enough to go to the IMCU.  Spoke with Dr. Mejia who will write admission orders to the [facility] and provide her further care.    I have discussed management of the patient with the following physicians and CASTRO's: Intensivist Dr. Gonda who will evaluate the patient as an admission to ICU  vs IMCU Dr Mejia hospitalist    Discussion of management with other [facility] or appropriate source(s): Pharmacy regarding antibiotic choice      Escalation of care considered, and ultimately not performed:    Barriers to care at this time, including but not limited to: She is diabetic which makes her more prone to sepsis and infection.     Decision tools and prescription drugs considered including, but not limited to: Antibiotics Rocephin .    CRITICAL CARE  The very real possibilty of a deterioration of this patient's condition required the highest level of my preparedness for sudden, emergent intervention.  I provided critical care services, which included medication orders, frequent reevaluations of the patient's condition and response to treatment, ordering and reviewing test results, and discussing the case with various consultants.  The critical care time associated with the care of the patient was 45 minutes. Review chart for interventions. This time is exclusive of any other billable procedures.    FINAL DIAGNOSIS  1. Sepsis, due to unspecified organism, " unspecified whether acute organ dysfunction present (HCC)    2. YARITZA (acute kidney injury) (HCC)    3. Pneumonia of left lung due to infectious organism, unspecified part of lung    4. Acute UTI    5. Hypoxia           Electronically signed by: Kaylee Glass M.D., 6/11/2023 5:55 PM

## 2023-06-12 NOTE — THERAPY
"Physical Therapy   Initial Evaluation     Patient Name: Claudine Augustine  Age:  67 y.o., Sex:  female  Medical Record #: [id number]  Today's Date: 6/12/2023     Precautions  Precautions: Fall Risk;Swallow Precautions    Assessment  Patient is 67 y.o. female with a diagnosis of severe sepsis and hypoxia. PMH includes R ankle fracture in December 2022, YARITZA and DM.      Patient received in bed and agreeable to PT session. Pt required mod A for bed mobility and supine to sit and required max A for scooting. Pt had difficulty maintaining an upright position at EOB. Pt performed STS and a side step transfer with FWW with mod A to the chair. Pt primarily limited by general deconditioning. Recommend post acute PT. Pt may be a long term care resident at [facility], but pt is unable to confirm. Will follow for acute care PT needs.     Plan    Physical Therapy Initial Treatment Plan   Treatment Plan : Bed Mobility, Gait Training, Neuro Re-Education / Balance, Therapeutic Activities, Therapeutic Exercise  Treatment Frequency: 3 Times per Week  Duration: Until Therapy Goals Met    DC Equipment Recommendations: Unable to determine at this time  Discharge Recommendations: Recommend post-acute placement for additional physical therapy services prior to discharge home       Subjective    \"I normally do not get up\".      Objective       06/12/23 4883   Initial Contact Note    Initial Contact Note Order Received and Verified, Physical Therapy Evaluation in Progress with Full Report to Follow.   Precautions   Precautions Fall Risk;Swallow Precautions   Vitals   O2 (LPM) 12   O2 Delivery Device Oxymask   Vitals Comments BP stable   Pain 0 - 10 Group   Therapist Pain Assessment Post Activity Pain Same as Prior to Activity;Nurse Notified  (pain not rated)   Prior Living Situation   Prior Services Other (Comments)  ([facility])   Housing / Facility Skilled Nursing Facility   Comments Pt reports prior to ankle fracture is December she was " independent at home. Since then, pt has been living at [facility] and reports she stopped getting therapy consistently about 3 months ago. Nurse reports pt's cognition fluctuates so this may not be accurate.   Prior Level of Functional Mobility   Bed Mobility Required Assist   Transfer Status Required Assist   Ambulation Required Assist   History of Falls   History of Falls Yes   Date of Last Fall   (fell in December and broke her ankle)   Cognition    Cognition / Consciousness WDL   Level of Consciousness Alert   Comments cooperative, fixated on having something to drink/eat   Strength Upper Body   Upper Body Strength  X   Gross Strength Generalized Weakness, Equal Bilaterally.    Comments unable to pull/push herself for bed mobility   Strength Lower Body   Lower Body Strength  X   Gross Strength Generalized Weakness, Equal Bilaterally   Balance Assessment   Sitting Balance (Static) Poor -   Sitting Balance (Dynamic) Poor -   Standing Balance (Static) Poor -   Standing Balance (Dynamic) Poor -   Weight Shift Sitting Poor   Weight Shift Standing Poor   Comments with FWW   Bed Mobility    Supine to Sit Moderate Assist   Scooting Maximal Assist   Comments HOB elevated   Gait Analysis   Gait Level Of Assist Unable to Participate   Comments side step to chair   Functional Mobility   Sit to Stand Moderate Assist   Bed, Chair, Wheelchair Transfer Minimal Assist   Transfer Method Stand Step   Mobility bed > chair   How much difficulty does the patient currently have...   Turning over in bed (including adjusting bedclothes, sheets and blankets)? 2   Sitting down on and standing up from a chair with arms (e.g., wheelchair, bedside commode, etc.) 1   Moving from lying on back to sitting on the side of the bed? 1   How much help from another person does the patient currently need...   Moving to and from a bed to a chair (including a wheelchair)? 2   Need to walk in a hospital room? 2   Climbing 3-5 steps with a railing? 1    6 clicks Mobility Score 9   Short Term Goals    Short Term Goal # 1 Pt will be able to perform bed mobility with min A to improve functional  mobility in 6 visits.   Short Term Goal # 2 Pt will be able to perform functional transfers with LRAD with min A to improve function in 6 visits.   Short Term Goal # 3 Pt will be able to ambulate 50 ft with LRAD with min A in 6 visits to improve functional mobility.   Education Group   Education Provided Role of Physical Therapist   Role of Physical Therapist Patient Response Patient;Acceptance;Explanation;Verbal Demonstration   Physical Therapy Initial Treatment Plan    Treatment Plan  Bed Mobility;Gait Training;Neuro Re-Education / Balance;Therapeutic Activities;Therapeutic Exercise   Treatment Frequency 3 Times per Week   Duration Until Therapy Goals Met   Problem List    Problems Impaired Bed Mobility;Impaired Transfers;Impaired Ambulation;Decreased Activity Tolerance;Motor Planning / Sequencing   Anticipated Discharge Equipment and Recommendations   DC Equipment Recommendations Unable to determine at this time   Discharge Recommendations Recommend post-acute placement for additional physical therapy services prior to discharge home   Interdisciplinary Plan of Care Collaboration   IDT Collaboration with  Nursing   Patient Position at End of Therapy Seated;Call Light within Reach;Tray Table within Reach;Phone within Reach   Collaboration Comments RN updated   Session Information   Date / Session Number  6/12 - 1 (1/3, 6/18)

## 2023-06-12 NOTE — ED NOTES
Med rec completed per Continuity of Care Document sent with patient from [facility].  Allergies reviewed per CCD.  Patient was on a 7 day course of cefdinir from 5/6/2023 - 5/13/2023, and on a 7 day course of Bactrim DS from 5/8/2023 - 5/15/2023.  As of 6/6/2023 patient's amlodipine was decreased from 10 mg x 1 tablet per day to 5 mg x 1 tablet per day, and patient's bupropion was decreased from 75 mg x 2 tablets 2 times per day to 75 mg x 2 tablets once per day, in the morning.

## 2023-06-12 NOTE — THERAPY
"Speech Language Pathology   Flexible Endoscopic Evaluation of Swallowing (FEES)        Patient Name: Claudine Augustine  AGE:  67 y.o., SEX:  female  Medical Record #: [id number]  Date of Service: 6/12/2023      History of Present Illness  68 y/o female presetned 6/11 from [facility] for low blood pressure, AMS, and concern for sepsis. Recently dx with UTI at [facility]. Hypoxic in the ED.    CXR 6/11:  \"1.  Diffuse ill-defined right greater than left interstitial opacities suspicious for pneumonitis favored over edema.\"      Pertinent Information  Current Method of Nutrition: NPO until cleared by speech pathology  Patient Behaviors: Confused   Dentition: Fair, Natural dentition   Feeding Tube: None   Tracheostomy: No   Factor(s) Affecting Performance: None     Discussed the risks, benefits, and alternatives of the FEES procedure. Patient/family acknowledged and agreed to proceed.      Assessment  Flexible Endoscopic Evaluation of Swallowing (FEES) completed at bedside today. The endoscope was passed transnasally via Right nare to evaluate the anatomy and physiology of swallowing. Pt tolerated the procedure with no apparent distress.  Anatomic Findings: Possible protrusion of the PPW near the level of the vallecula  Vocal Fold Motion: Bilateral movement  Secretion Management:  (Coughed to clear white/yellow mucus secretions from trachea multiple times during FEES)  PO Trials: Ice Chips, Thin Liquid, Mildly Thick Liquid, Liquidised, Pudding, Soft & Bite Sized, Regular Solid, Mixed      Consistency PAS Score Timing Residue Comments   Thin Liquid 8 Pre Swallow, During swallow Vallecular Residue: Mild (5%-25%)  Pyriform Sinus Residue: Mild (5%-25%) Tsp - PAS 1 x2  Cup - PAS 1, PAS 2, PAS 5, PAS 6, PAS 8 x2  Cup BEFORE - PAS 4, PAS 8   Cup 3sec prep - PAS 3     Mildly Thick 5 During Swallow Vallecular Residue: Mild (5%-25%)  Pyriform Sinus Residue: Mild (5%-25%) Cup - PAS 1 x3  Straw - PAS 1, PAS 5   Liquidised 3 During Swallow, Pre " Swallow Vallecular Residue: Mild (5%-25%)  Pyriform Sinus Residue: Trace (1%-5%) PAS 1, PAS 3   Pudding 3 During Swallow Vallecular Residue: Moderate (25%-50%)  Pyriform Sinus Residue: Trace (1%-5%) PAS 1, trace PAS 3   Soft & Bite Sized 1 N/A Vallecular Residue: Mild (5%-25%)  Pyriform Sinus Residue: Trace (1%-5%)    Regular Solid 1 N/A Vallecular Residue: Moderate (25%-50%)  Pyriform Sinus Residue: None (0%)    Mixed 1 N/A Vallecular Residue: Moderate (25%-50%)  Pyriform Sinus Residue: Trace (1%-5%)      Penetration-Aspiration Scale (PAS)  1     No contrast enters airway  2     Contrast enters the airway, remains above the vocal folds, and is ejected from the airway (not seen in the airway at the end of the swallow).  3     Contrast enters the airway, remains above the vocal folds, and is not ejected from the airway (is seen in the airway after the swallow).  4     Contrast enters the airway, contacts the vocal folds, and is ejected from the airway.  5     Contrast enters the airway, contacts the vocal folds, and is not ejected from the airway  6     Contrast enters the airway, crosses the plane of the vocal folds, and is ejected from the airway.  7     Contrast enters the airway, crosses the plane of the vocal folds, and is not ejected from the airway despite effort.  8     Contrast enters the airway, crosses the plane of the vocal folds, is not ejected from the airway and there is no response to aspiration.      Oral phase:  Oral bolus stripping WFL. Complete AP transit. Mastication of soft solid and regular solids was incomplete (including near full peach being moved from the oral cavity into the lateral channel). Loss of bolus control x2 with thins by cup, resulting in aspiration and deep penetration during the swallow with inconsistent clearance.    Pharyngeal phase:  Pharyngeal phase characterized by impairments in pharyngeal sensation, BOT retraction, LVC, epiglottic inversion, pharyngeal shortening, and  pharyngeal constriction  which resulted in the following:  - Aspiration of TN0 by cup during the swallow (inferred) and before the swallow with loss of bolus control. Aspirate did clear from the airway using cued coughs  - Deep penetration (to the vocal folds) with TN0 by cup and MT2 by straw during the swallow (inferred). Penetrate did clear from the airway using cued coughs  - Penetration with TN0, LQ3, and PU4 during the swallow (inferred). Trace amounts with PU4 and diffuse amounts with TN0 and LQ3. Penetrate did clear from the airway using cued cleansing swallow.  - Moderate vallecular residue with solid consistencies, greater on the R, that did not clear spontaneously  -  Mild to moderate PPW residue with solid consistencies that did not consistently clear spontaneously  - Pt was not sensate to airway invasion  - Pt was inconsistently sensate to residue    Of note - cued and spontaneous coughs did clear secretions from the trachea. Pt also did cough in the absence of airway invasion during the study.       Compensatory Strategies:  Cued cough - EFFECTIVE to clear aspirate  Multiple swallows - INCONSISTENTLY EFFECTIVE to clear vallecular residue  Liquid wash - INCONSISTENTLY EFFECTIVE to clear vallecular residue    3-sec prep - EFFECTIVE to reduce airway invasion with TN0; pt unable to consistently complete    Severity Rating:  CASA: Mild-Moderate      Clinical Impressions  The pt presents with a mild-moderate oropharyngeal dysphagia, likely acute on subacute related to severe sepsis, respiratory failure, and reported ongoing changes to swallow function over the past 3-5 months. Swallow safety is impaired; pharyngeal efficiency is impaired. Would recommend minced and moist solids with mildly thick liquids at this with use of liquid wash, no straws, and 1:1 spv, at least while pt is on Oxymask and will need A to coordinate feeding with the mask. Pt will likely need repeat diagnostic evaluation prior to upgrading  "diet given silent aspiration of TN0 liquids. Risks for dysphagia-related sequela do remain elevated; dysphagia outcomes can be maximized with use of mobility as pt is able and frequent, thorough oral care. Pt appears to be a fair candidate for ongoing behavorial and exercise-based swallow rehabilitation.         Recommendations  Minced and moist with mildly thick liquids  Medication: As tolerated  Supervision: 1:1 feeding with constant supervision, Careful 1:1 hand feeding (While pt is on Oxymask. Suspect pt can eat with monitor only should oxygen needs change (NC vs. HFNC))  Positioning: Fully upright and midline during oral intake  Strategies: Small bites/sips, Alternate bites and sips, Slow rate of intake, Multiple swallows (2) per bite/sips, No straws  Oral Care: Q6h  Additional Instrumentation: Repeat diagnostic study when clinically appropriate         SLP Treatment Plan  Treatment Plan: Dysphagia Treatment, Patient/Family/Caregiver Training  SLP Frequency: 3x Per Week  Estimated Duration: Until Therapy Goals Met      Anticipated Discharge Needs  Discharge Recommendations: Recommend post-acute placement for additional speech therapy services prior to discharge home   Therapy Recommendations Upon DC: Dysphagia Training, Patient / Family / Caregiver Education, Community Re-Integration       Patient / Family Goals  Patient / Family Goal #1: \"Do I get to eat?\"  Goal #1 Outcome: Progressing as expected  Short Term Goal # 1: Pt will participate in FEES with SLP to further evaluate swallow function and inform POC.  Goal Outcome # 1: Goal met, new goal added  Short Term Goal # 1 B : Pt will consume diet of MM5/MT2 given strategy use with no overt s/sx of aspiration or worsening of lung status.  Short Term Goal # 2: Pt will complete exercises targeting BOT retraction, LVC, epiglottic inversion, pharyngeal shortening, and pharyngeal constriction x20 in a session given mod cues from SLP with \"good\" accuracy.      Lucia HER " [place], SLP

## 2023-06-12 NOTE — PROGRESS NOTES
4 Eyes Skin Assessment Completed by GUADALUPE Arriola and GUADALUPE brady.    Head WDL  Ears WDL  Nose WDL  Mouth WDL  Neck WDL  Breast/Chest WDL  Shoulder Blades WDL  Spine WDL  (R) Arm/Elbow/Hand WDL  (L) Arm/Elbow/Hand WDL  Abdomen Bruising to LLQ  Groin WDL  Scrotum/Coccyx/Buttocks Redness and Excoriation  (R) Leg Scab R knee  (L) Leg Scab L shin  (R) Heel/Foot/Toe Scar  (L) Heel/Foot/Toe Redness toes                                Devices In Places ECG, Blood Pressure Cuff, Pulse Ox, and Oxy Mask      Interventions In Place Pillows, Q2 Turns, Low Air Loss Mattress, Barrier Cream, Dri-Del Pads, and Heels Loaded W/Pillows    Possible Skin Injury Yes    Pictures Uploaded Into Epic Yes  Wound Consult Placed Yes  RN Wound Prevention Protocol Ordered Yes

## 2023-06-12 NOTE — ED TRIAGE NOTES
"Chief Complaint   Patient presents with    UTI     From [facility] with symptoms of UTI not currently on antibiotics, dx with UTI at [facility] x2 days ago, hx chronic UTI    Hypotension     86/54 with EMS, 300 LR given en route    ALOC     Aox2 currently, baseline Aox4         BIB [facility] for above complaint. EMS vitals 86/54  RA 88% 4L 92% glucose 241     Code sepsis protocols ordered. Labs drawn.      Ht 1.727 m (5' 8\")   Wt 83.9 kg (185 lb)   BMI 28.13 kg/m²      "

## 2023-06-12 NOTE — ED NOTES
Per day shift RN pt was maintaining oxygen saturation >92% on 4L NC prior to CT. When pt returned from CT, oxygen saturation 88% on 4l NC with good wave form. Pt now requiring 10L NRB to maintain oxygen saturation >90%. ERP updated.

## 2023-06-12 NOTE — THERAPY
"Speech Language Pathology   Clinical Swallow Evaluation     Patient Name: Claudine Augustine  AGE:  67 y.o., SEX:  female  Medical Record #: [id number]  Date of Service: 2023      History of Present Illness  66 y/o female presetned  from [facility] for low blood pressure, AMS, and concern for sepsis. Recently dx with UTI at [facility]. Hypoxic in the ED.     No hx SLP in [facility]. Failed RN nursing screen.     CMHx: Severe sepsis, YARITZA  PMHx: DM, UTI    CXR :  \"1.  Diffuse ill-defined right greater than left interstitial opacities suspicious for pneumonitis favored over edema.\"    CT Head w/o :  \"1.  No acute intracranial process.  2.  There is diffuse cerebral atrophy.  3.  There is low attenuation in the periventricular white matter most consistent with chronic small vessel ischemic change, although gliosis and demyelination could have this appearance.\"    General Information:  Vitals  Pulse Oximetry: 97 %  O2 (LPM): 12  O2 Delivery Device: Oxymask  Vitals Comments: High to mid 90s with oxymask in place. Pt removed oxymask to use emesis bag, during this time, O2 dropped into low 90s.  Level of Consciousness: Alert, Awake  Patient Behaviors: Confused, Withdrawn  Orientation: Self, , General place, Situation  Follows Directives: Yes - simple commands only      Prior Living Situation & Level of Function:  Communication: WFL  Swallowing: WFL  Comments: Reports that emesis and cough are new in the last 3-4 mo      Oral Mechanism Evaluation:  Dentition: Fair, Natural dentition   Facial Symmetry: Equal  Facial Sensation: Equal     Labial Observations: WFL   Lingual Observations: Midline  Motor Speech: WFL        Laryngeal Function:  Secretion Management: Adequate  Voice Quality: WFL  Cough: Perceptually WNL  Comments: Perceptually strong, hacking cough throughout (before, during, and after PO). Intermittently congested but never productive.      Subjective  Pt agreeable and cooperative with SLP evaluation tasks. \"Am I " "going to get some food?\"      Assessment  Current Method of Nutrition: NPO until cleared by speech pathology  Positioning: Bed - Chair Position  Bolus Administration: SLP  O2 (LPM): 12 O2 Delivery Device: Oxymask  Factor(s) Affecting Performance: None  Tracheostomy : No       Swallowing Trials:  Ice: WFL  Thin Liquid (TN0): Impaired      Comments: Oral phase broadly intact. Immediate coughing response following sips of TN0 liquids, which is concerning for airway invasion. Pt also had emesis event during session with significant coughing after vomiting, which could also be concerning for airway invasion of gastric contents. Cough was ongoing but never productive. Discussed concern for dysphagia and aspiration; pt agreeable to FEES (recommend given high O2 needs and watcher status to keep pt on the floor).      Clinical Impressions  Pt presents with clinical indicators of and elevated risk for oropharyngeal dysphagia given results of CXR, severe sepsis, and ongoing cough for the past 3-4mo. Pt would benefit from further evaluation of swallow using FEES prior to meaningful initiation of PO. Tentatively scheduled for this AM.         Recommendations  NPO with ice pending FEES  Instrumentation: FEES  Medication: Non Oral  Oral Care: Q6h         SLP Treatment Plan  Treatment Plan: Dysphagia Treatment, Patient/Family/Caregiver Training  SLP Frequency: 3x Per Week  Estimated Duration: Until Therapy Goals Met      Anticipated Discharge Needs  Discharge Recommendations: Recommend post-acute placement for additional speech therapy services prior to discharge home   Therapy Recommendations Upon DC: Dysphagia Training, Patient / Family / Caregiver Education, Community Re-Integration        Patient / Family Goals  Patient / Family Goal #1: \"Do I get to eat?\"  Short Term Goals  Short Term Goal # 1: Pt will participate in FEES with SLP to further evaluate swallow function and inform POC.      Lucia Rose, JADA   "

## 2023-06-12 NOTE — PROGRESS NOTES
to bedside.   Pt A&ox2. Moves all extremities. Pt denies pain, n/t.   Moist wet cough, pt on 10L oxy mask.   Incontinent of stool and urine.    IV to R FA and L wrist.

## 2023-06-12 NOTE — H&P
Hospital Medicine History & Physical Note    Date of Service  6/11/2023    Primary Care Physician  No primary care provider on file.    Consultants  Intensvist    Code Status  Full Code    Chief Complaint  Chief Complaint   Patient presents with   • UTI     From [facility] with symptoms of UTI not currently on antibiotics, dx with UTI at [facility] x2 days ago, hx chronic UTI   • Hypotension     86/54 with EMS, 300 LR given en route   • ALOC     Aox2 currently, baseline Aox4        History of Presenting Illness  Claudine Augustine is a 67 y.o. female who presented 6/11/2023 with altered mental status.    Patient has a history of hypertension, insulin-dependent diabetes.  Patient had a history of an ankle fracture in January 2023 that subsequently developed osteomyelitis.  She was treated with vancomycin x 6 weeks. She was recently diagnosed with UTI 2 days ago at [facility].     Patient is a resident at [facility].  She was found to have altered mental status and low blood pressure at outside facility.  She is currently AAO x2 and able to provide some history.  Due to her altered mental status and hypotension, she was brought into the ED for further evaluation.    In the ED, patient initially hypotensive and tachycardic, however blood pressure responded to fluids.  Pertinent labs include white blood cell count 23.2, hemoglobin 8.9, hyponatremia, YARITZA.  UA positive for UTI.  CT head negative for acute intracranial abnormality.  Chest x-ray showing diffuse ill-defined interstitial opacities, right greater than left, concerning for pneumonitis.    I discussed the plan of care with patient.    Review of Systems  Review of Systems   Constitutional:  Positive for chills and malaise/fatigue.   HENT: Negative.     Eyes: Negative.    Respiratory: Negative.     Cardiovascular: Negative.    Musculoskeletal: Negative.    Skin: Negative.    Neurological: Negative.    Endo/Heme/Allergies: Negative.    Psychiatric/Behavioral: Negative.         Past  Medical History  No pertinent medical history    Surgical History  No pertinent surgical history    Family History   Family history reviewed with patient. There is no family history that is pertinent to the chief complaint.     Social History   reports that she has quit smoking. Her smoking use included cigarettes. She has never used smokeless tobacco. She reports that she does not currently use alcohol. She reports that she does not use drugs.    Allergies  No Known Allergies    Medications  Prior to Admission Medications   Prescriptions Last Dose Informant Patient Reported? Taking?   acetaminophen (TYLENOL) 325 MG Tab 5/26/2023 at 0114 MAR from Other Facility Yes Yes   Sig: Take 650 mg by mouth every four hours as needed for Mild Pain. 2 tablets = 650 mg.   amLODIPine (NORVASC) 5 MG Tab 6/11/2023 at 0933 MAR from Other Facility Yes Yes   Sig: Take 5 mg by mouth every day.   buPROPion (WELLBUTRIN) 75 MG Tab 6/10/2023 at 0933 MAR from Other Facility Yes Yes   Sig: Take 150 mg by mouth every day. 2 tablets = 150 mg.   cefdinir (OMNICEF) 300 MG Cap 5/13/2023 at 1958; FINISHED MAR from Other Facility Yes Yes   Sig: Take 300 mg by mouth 2 times a day. 7 day course (5/6/2023 - 5/13/2023).   heparin 5000 UNIT/ML Solution 6/11/2023 at 1241 MAR from Other Facility Yes Yes   Sig: Inject 5,000 Units under the skin every 8 hours.   insulin aspart (NOVOLOG) 100 UNIT/ML Solution 6/11/2023 at 1234 MAR from Other Facility Yes Yes   Sig: Inject 2-12 Units under the skin 4 Times a Day,Before Meals and at Bedtime. Inject per sliding scale. For blood glucose:  200 - 250 = 2 units  251 - 300 = 4 units  301 - 350 = 6 units  351 - 400 = 8 units  401 - 450 = 10 units  451 - 500 = 12 units  If >500 or <60, call M.D.   insulin glargine 100 UNIT/ML SC SOLN 6/11/2023 at 0918 MAR from Other Facility Yes Yes   Sig: Inject 28 Units under the skin every day.   losartan (COZAAR) 100 MG Tab 6/11/2023 at 0933 MAR from Other Facility Yes Yes   Sig:  Take 100 mg by mouth every day.   melatonin 3 MG Tab 6/9/2023 at 2040 MAR from Other Facility Yes Yes   Sig: Take 3 mg by mouth at bedtime.   metFORMIN (GLUCOPHAGE) 500 MG Tab 6/11/2023 at 0933 MAR from Other Facility Yes Yes   Sig: Take 500 mg by mouth every day.   omeprazole (PRILOSEC) 20 MG delayed-release capsule 6/11/2023 at 0933 MAR from Other Facility Yes Yes   Sig: Take 20 mg by mouth every day.   sennosides (SENOKOT) 8.6 MG Tab 6/11/2023 at 0933 MAR from Other Facility Yes Yes   Sig: Take 8.6 mg by mouth 2 times a day. Hold for loose stool.   sulfamethoxazole-trimethoprim (BACTRIM DS) 800-160 MG tablet 5/15/2023 at 1914; FINISHED MAR from Other Facility Yes Yes   Sig: Take 1 Tablet by mouth 2 times a day. 7 day course (5/8/2023 - 5/15/2023).      Facility-Administered Medications: None       Physical Exam  Temp:  [36.6 °C (97.8 °F)] 36.6 °C (97.8 °F)  Pulse:  [] 107  Resp:  [21-31] 27  BP: ()/(51-61) 101/58  SpO2:  [88 %-94 %] 90 %  Blood Pressure : 101/58   Temperature: 36.6 °C (97.8 °F)   Pulse: (!) 107   Respiration: (!) 27   Pulse Oximetry: 90 %       Physical Exam  Constitutional:       Appearance: Normal appearance. She is normal weight.   HENT:      Head: Normocephalic.      Nose: Nose normal.      Mouth/Throat:      Mouth: Mucous membranes are moist.   Eyes:      Pupils: Pupils are equal, round, and reactive to light.   Cardiovascular:      Rate and Rhythm: Normal rate and regular rhythm.      Pulses: Normal pulses.   Pulmonary:      Effort: Pulmonary effort is normal.      Comments: Saturating 93% with oxygen mask  Crackles heard on lung bases bilaterally  Abdominal:      General: Abdomen is flat.      Palpations: Abdomen is soft.   Musculoskeletal:         General: Normal range of motion.      Cervical back: Neck supple.   Skin:     General: Skin is warm.   Neurological:      General: No focal deficit present.      Mental Status: She is alert.      Comments: AAO x2  Follow simple  commands       Laboratory:  Recent Labs     06/11/23  1755   WBC 23.2*   RBC 3.32*   HEMOGLOBIN 8.9*   HEMATOCRIT 26.7*   MCV 80.4*   MCH 26.8*   MCHC 33.3   RDW 46.7   PLATELETCT 474*   MPV 8.4*     Recent Labs     06/11/23  1755   SODIUM 128*   POTASSIUM 4.5   CHLORIDE 95*   CO2 16*   GLUCOSE 193*   BUN 46*   CREATININE 2.91*   CALCIUM 8.6     Recent Labs     06/11/23  1755   ALTSGPT 6   ASTSGOT 6*   ALKPHOSPHAT 88   TBILIRUBIN 0.3   GLUCOSE 193*         No results for input(s): NTPROBNP in the last 72 hours.      No results for input(s): TROPONINT in the last 72 hours.    Imaging:  CT-HEAD W/O   Final Result      1.  No acute intracranial process.   2.  There is diffuse cerebral atrophy.   3.  There is low attenuation in the periventricular white matter most consistent with chronic small vessel ischemic change, although gliosis and demyelination could have this appearance.         DX-CHEST-PORTABLE (1 VIEW)   Final Result      1.  Diffuse ill-defined right greater than left interstitial opacities suspicious for pneumonitis favored over edema.          X-Ray:  I have personally reviewed the images and compared with prior images.    Assessment/Plan:  Justification for Admission Status  I anticipate this patient will require at least two midnights for appropriate medical management, necessitating inpatient admission because patient has sepsis secondary to UTI    Patient will need a Intermediate Care (Adult and Pediatrics) bed on MEDICAL service .  The need is secondary to sepsis.    * Severe sepsis (HCC)- (present on admission)  Assessment & Plan  This is Severe Sepsis Present on admission  SIRS criteria identified on my evaluation include: Fever, with temperature greater than 100.9 deg F, Tachycardia, with heart rate greater than 90 BPM and Leukocytosis, with WBC greater than 12,000  Clinical indicators of end organ dysfunction include Systolic blood pressure (SBP) <90 mmHg or mean arterial pressure <65  mmHg  Source is UA  Sepsis protocol initiated  Crystalloid Fluid Administration: Fluid resuscitation ordered per standard protocol - 30 mL/kg per current or ideal body weight  IV antibiotics as appropriate for source of sepsis  Reassessment: I have reassessed the patient's hemodynamic status    Spoke with ERP.  Patient presents with UTI for several days.  In ED, found to have tachycardia, fever, white blood cell count 23, YARITZA.  She initially presented hypotensive but responded to fluids.  UA positive for UTI.  Chest x-ray showing pneumonitis.  Patient started on ceftriaxone.  Patient will need to be monitored overnight in the stepdown unit for worsening organ damage from her severe sepsis, rash and diarrhea from ceftriaxone administration.  She has also been confused and has failed swallow eval at this time, hold all oral medications for now until formal SLP tomorrow.    DM (diabetes mellitus) ([facility])  Assessment & Plan  Sliding-scale  Fluids    ACP (advance care planning)  Assessment & Plan  I discussed advance care planning with the patient for at least 16 minutes, including diagnosis, prognosis, plan of care, risks and benefits of any therapies that could be offered, as well as alternatives including palliation and hospice, as appropriate.     Full code      YARITZA (acute kidney injury) ([facility])  Assessment & Plan  Likely from a combination of severe sepsis and dehydration.  Fluids.  BMP.  Avoid nephrotoxic medications.        VTE prophylaxis: heparin ppx

## 2023-06-12 NOTE — ED NOTES
Pt transported to ICU with ACLS RN connected to monitor and oxygen. All belongings and chart transported with pt.

## 2023-06-12 NOTE — PROGRESS NOTES
Hospital Medicine Daily Progress Note    Date of Service  6/12/2023    Chief Complaint  Claudine Augustine is a 67 y.o. female admitted 6/11/2023 with altered mental status    Hospital Course    Claudine Augustine is a 67 y.o. female who presented 6/11/2023 with altered mental status.     Patient has a history of hypertension, insulin-dependent diabetes.  Patient had a history of an ankle fracture in January 2023 that subsequently developed osteomyelitis.  She was treated with vancomycin x 6 weeks. She was recently diagnosed with UTI 2 days ago at [facility].      Patient is a resident at [facility].  She was found to have altered mental status and low blood pressure at outside facility.  She is currently AAO x2 and able to provide some history.  Due to her altered mental status and hypotension, she was brought into the ED for further evaluation.     In the ED, patient initially hypotensive and tachycardic, however blood pressure responded to fluids.  Pertinent labs include white blood cell count 23.2, hemoglobin 8.9, hyponatremia, YARITZA.  UA positive for UTI.  CT head negative for acute intracranial abnormality.  Chest x-ray showing diffuse ill-defined interstitial opacities, right greater than left, concerning for pneumonitis.    Patient admitted to [facility] for close monitoring and she was started on IV antibiotics.  I change her IV antibiotics from ceftriaxone to Unasyn on June 12, 2023.      Interval Problem Update    06/12/23    I evaluated and examined her at the bedside.  She reported that she has been having productive cough.  I discussed plan of care with her regarding administration of antibiotic.  I change her antibiotic from ceftriaxone to Unasyn.  Follow culture results.  I discussed plan of care with the speech therapist.  Continue to provide oxygen and titrate down oxygen as tolerated.    I have discussed this patient's plan of care and discharge plan at IDT rounds today with Case Management, Nursing, Nursing leadership, and  other members of the IDT team.    Consultants/Specialty  None    Code Status  Full Code    Disposition  The patient is not medically cleared for discharge to home or a post-acute facility.  Anticipate discharge to: skilled nursing facility    I have placed the appropriate orders for post-discharge needs.    Review of Systems  Review of Systems   Constitutional:  Positive for malaise/fatigue. Negative for chills, fever and weight loss.   HENT:  Negative for hearing loss and tinnitus.    Eyes:  Negative for blurred vision, double vision, photophobia and pain.   Respiratory:  Positive for cough, sputum production and shortness of breath.    Cardiovascular:  Negative for chest pain, palpitations, orthopnea and leg swelling.   Gastrointestinal:  Negative for abdominal pain, constipation, diarrhea, nausea and vomiting.   Genitourinary:  Negative for dysuria, frequency and urgency.   Musculoskeletal:  Negative for back pain, joint pain, myalgias and neck pain.   Skin:  Negative for rash.   Neurological:  Negative for dizziness, tingling, tremors, sensory change, speech change, focal weakness and headaches.   Psychiatric/Behavioral:  Negative for hallucinations and substance abuse.    All other systems reviewed and are negative.       Physical Exam  Temp:  [36.4 °C (97.6 °F)-36.9 °C (98.4 °F)] 36.4 °C (97.6 °F)  Pulse:  [] 95  Resp:  [20-31] 20  BP: ()/(51-72) 108/63  SpO2:  [88 %-97 %] 97 %    Physical Exam  Vitals reviewed.   Constitutional:       General: She is not in acute distress.     Appearance: Normal appearance. She is not ill-appearing.   HENT:      Head: Normocephalic and atraumatic.      Nose: No congestion.      Mouth/Throat:      Comments: Oxymask  Eyes:      General:         Right eye: No discharge.         Left eye: No discharge.      Pupils: Pupils are equal, round, and reactive to light.   Cardiovascular:      Rate and Rhythm: Normal rate and regular rhythm.      Pulses: Normal pulses.       Heart sounds: Normal heart sounds. No murmur heard.  Pulmonary:      Effort: Respiratory distress present.      Breath sounds: No stridor. Rhonchi present.      Comments: Tachypnea  Abdominal:      General: Bowel sounds are normal. There is no distension.      Palpations: Abdomen is soft.      Tenderness: There is no abdominal tenderness.   Musculoskeletal:         General: No swelling or tenderness. Normal range of motion.      Cervical back: Normal range of motion. No rigidity.   Skin:     General: Skin is warm.      Capillary Refill: Capillary refill takes less than 2 seconds.      Coloration: Skin is not jaundiced or pale.      Findings: No bruising.   Neurological:      General: No focal deficit present.      Mental Status: She is alert and oriented to person, place, and time.      Cranial Nerves: No cranial nerve deficit.      Comments: She is moving all her extremities   Psychiatric:         Mood and Affect: Mood normal.         Behavior: Behavior normal.         Fluids    Intake/Output Summary (Last 24 hours) at 6/12/2023 1223  Last data filed at 6/12/2023 1100  Gross per 24 hour   Intake --   Output 1250 ml   Net -1250 ml       Laboratory  Recent Labs     06/11/23  1755 06/12/23  0315   WBC 23.2* 19.8*   RBC 3.32* 3.12*   HEMOGLOBIN 8.9* 8.3*   HEMATOCRIT 26.7* 25.9*   MCV 80.4* 83.0   MCH 26.8* 26.6*   MCHC 33.3 32.0*   RDW 46.7 48.2   PLATELETCT 474* 470*   MPV 8.4* 8.8*     Recent Labs     06/11/23  1755 06/12/23  0315   SODIUM 128* 136   POTASSIUM 4.5 4.3   CHLORIDE 95* 102   CO2 16* 18*   GLUCOSE 193* 179*   BUN 46* 36*   CREATININE 2.91* 1.77*   CALCIUM 8.6 8.6                   Imaging  DX-CHEST-LIMITED (1 VIEW)   Final Result         1.  Pulmonary edema and/or infiltrates are identified, which are stable since the prior exam.   2.  Atherosclerosis      CT-HEAD W/O   Final Result      1.  No acute intracranial process.   2.  There is diffuse cerebral atrophy.   3.  There is low attenuation in the  periventricular white matter most consistent with chronic small vessel ischemic change, although gliosis and demyelination could have this appearance.         DX-CHEST-PORTABLE (1 VIEW)   Final Result      1.  Diffuse ill-defined right greater than left interstitial opacities suspicious for pneumonitis favored over edema.           Assessment/Plan  * Severe sepsis (HCC)- (present on admission)  Assessment & Plan  This is Severe Sepsis Present on admission  SIRS criteria identified on my evaluation include: Fever, with temperature greater than 100.9 deg F, Tachycardia, with heart rate greater than 90 BPM and Leukocytosis, with WBC greater than 12,000  Clinical indicators of end organ dysfunction include Systolic blood pressure (SBP) <90 mmHg or mean arterial pressure <65 mmHg  Source is UA  Sepsis protocol initiated  Crystalloid Fluid Administration: Fluid resuscitation ordered per standard protocol - 30 mL/kg per current or ideal body weight  IV antibiotics as appropriate for source of sepsis  Reassessment: I have reassessed the patient's hemodynamic status    Continue to provide her oxygen and titrate down as tolerated.  I will change her antibiotic from ceftriaxone to Unasyn.  UA is negative for nitrite and positive with leukocyte esterase.  Follow culture results were  I discussed plan of care with patient and answered all questions.    Acute respiratory failure with hypoxia ([facility])  Assessment & Plan  Patient found to have acute respiratory failure with hypoxia she is requiring 2 L of oxygen to maintain oxygen saturation.  Continue IV antibiotic  Ordered procalcitonin level for tomorrow morning although it would be less reliable in the setting of YARITZA.  I ordered echocardiogram.  Continue to monitor closely in IMCU.    DM (diabetes mellitus) ([facility])  Assessment & Plan  Last HbA1c in December was 11.2.  I reduce the dose of insulin glargine as patient is n.p.o. pending final recommendation from speech  therapy.  Continue seeing sliding scale with hypoglycemia protocol.  Continue to monitor.      ACP (advance care planning)  Assessment & Plan  Admitting hospitalist discussed CODE STATUS with patient     I discussed advance care planning with the patient for at least 16 minutes, including diagnosis, prognosis, plan of care, risks and benefits of any therapies that could be offered, as well as alternatives including palliation and hospice, as appropriate.     Full code      YARITZA (acute kidney injury) (HCC)  Assessment & Plan  Most likely prerenal.  Renal function has been improving.  Avoid nephrotoxins.  Medication dose adjustment as per renal functions  Continue to monitor.         I discussed plan of care during multidisciplinary rounds regarding patient's current medical condition and plan of care.        VTE prophylaxis: heparin ppx    I have performed a physical exam and reviewed and updated ROS and Plan today (6/12/2023). In review of yesterday's note (6/11/2023), there are no changes except as documented above.

## 2023-06-12 NOTE — ASSESSMENT & PLAN NOTE
This is Severe Sepsis Present on admission  SIRS criteria identified on my evaluation include: Fever, with temperature greater than 100.9 deg F, Tachycardia, with heart rate greater than 90 BPM and Leukocytosis, with WBC greater than 12,000  Clinical indicators of end organ dysfunction include Systolic blood pressure (SBP) <90 mmHg or mean arterial pressure <65 mmHg  Source is pneumonia.  Sepsis protocol initiated    IV antibiotics as appropriate for source of sepsisCrystalloid Fluid Administration: Fluid resuscitation was given.  Reassessment: I have reassessed the patient's hemodynamic status

## 2023-06-13 PROBLEM — G93.40 ENCEPHALOPATHY ACUTE: Status: ACTIVE | Noted: 2023-06-13

## 2023-06-13 PROBLEM — J18.9 PNEUMONIA: Status: ACTIVE | Noted: 2023-06-13

## 2023-06-13 PROBLEM — Z71.89 ACP (ADVANCE CARE PLANNING): Status: RESOLVED | Noted: 2023-06-11 | Resolved: 2023-06-13

## 2023-06-13 LAB
ALBUMIN SERPL BCP-MCNC: 2.8 G/DL (ref 3.2–4.9)
ALBUMIN/GLOB SERPL: 0.8 G/DL
ALP SERPL-CCNC: 92 U/L (ref 30–99)
ALT SERPL-CCNC: 6 U/L (ref 2–50)
ANION GAP SERPL CALC-SCNC: 14 MMOL/L (ref 7–16)
AST SERPL-CCNC: 7 U/L (ref 12–45)
BACTERIA UR CULT: NORMAL
BILIRUB SERPL-MCNC: 0.2 MG/DL (ref 0.1–1.5)
BUN SERPL-MCNC: 25 MG/DL (ref 8–22)
CALCIUM ALBUM COR SERPL-MCNC: 9.5 MG/DL (ref 8.5–10.5)
CALCIUM SERPL-MCNC: 8.5 MG/DL (ref 8.5–10.5)
CHLORIDE SERPL-SCNC: 101 MMOL/L (ref 96–112)
CO2 SERPL-SCNC: 19 MMOL/L (ref 20–33)
CREAT SERPL-MCNC: 1.12 MG/DL (ref 0.5–1.4)
ERYTHROCYTE [DISTWIDTH] IN BLOOD BY AUTOMATED COUNT: 47 FL (ref 35.9–50)
EST. AVERAGE GLUCOSE BLD GHB EST-MCNC: 169 MG/DL
GFR SERPLBLD CREATININE-BSD FMLA CKD-EPI: 54 ML/MIN/1.73 M 2
GLOBULIN SER CALC-MCNC: 3.4 G/DL (ref 1.9–3.5)
GLUCOSE BLD STRIP.AUTO-MCNC: 117 MG/DL (ref 65–99)
GLUCOSE BLD STRIP.AUTO-MCNC: 143 MG/DL (ref 65–99)
GLUCOSE BLD STRIP.AUTO-MCNC: 155 MG/DL (ref 65–99)
GLUCOSE BLD STRIP.AUTO-MCNC: 191 MG/DL (ref 65–99)
GLUCOSE BLD STRIP.AUTO-MCNC: 88 MG/DL (ref 65–99)
GLUCOSE SERPL-MCNC: 110 MG/DL (ref 65–99)
HBA1C MFR BLD: 7.5 % (ref 4–5.6)
HCT VFR BLD AUTO: 25.7 % (ref 37–47)
HGB BLD-MCNC: 8.3 G/DL (ref 12–16)
MAGNESIUM SERPL-MCNC: 1.8 MG/DL (ref 1.5–2.5)
MCH RBC QN AUTO: 26.4 PG (ref 27–33)
MCHC RBC AUTO-ENTMCNC: 32.3 G/DL (ref 32.2–35.5)
MCV RBC AUTO: 81.8 FL (ref 81.4–97.8)
PLATELET # BLD AUTO: 483 K/UL (ref 164–446)
PMV BLD AUTO: 8.5 FL (ref 9–12.9)
POTASSIUM SERPL-SCNC: 3.6 MMOL/L (ref 3.6–5.5)
PROCALCITONIN SERPL-MCNC: 8.33 NG/ML
PROT SERPL-MCNC: 6.2 G/DL (ref 6–8.2)
RBC # BLD AUTO: 3.14 M/UL (ref 4.2–5.4)
SIGNIFICANT IND 70042: NORMAL
SITE SITE: NORMAL
SODIUM SERPL-SCNC: 134 MMOL/L (ref 135–145)
SOURCE SOURCE: NORMAL
WBC # BLD AUTO: 15.4 K/UL (ref 4.8–10.8)

## 2023-06-13 PROCEDURE — A9270 NON-COVERED ITEM OR SERVICE: HCPCS | Performed by: STUDENT IN AN ORGANIZED HEALTH CARE EDUCATION/TRAINING PROGRAM

## 2023-06-13 PROCEDURE — 700105 HCHG RX REV CODE 258: Performed by: INTERNAL MEDICINE

## 2023-06-13 PROCEDURE — 99233 SBSQ HOSP IP/OBS HIGH 50: CPT | Performed by: HOSPITALIST

## 2023-06-13 PROCEDURE — 700111 HCHG RX REV CODE 636 W/ 250 OVERRIDE (IP): Performed by: STUDENT IN AN ORGANIZED HEALTH CARE EDUCATION/TRAINING PROGRAM

## 2023-06-13 PROCEDURE — 80053 COMPREHEN METABOLIC PANEL: CPT

## 2023-06-13 PROCEDURE — 700111 HCHG RX REV CODE 636 W/ 250 OVERRIDE (IP): Performed by: INTERNAL MEDICINE

## 2023-06-13 PROCEDURE — 83036 HEMOGLOBIN GLYCOSYLATED A1C: CPT

## 2023-06-13 PROCEDURE — 770020 HCHG ROOM/CARE - TELE (206)

## 2023-06-13 PROCEDURE — 700102 HCHG RX REV CODE 250 W/ 637 OVERRIDE(OP): Performed by: HOSPITALIST

## 2023-06-13 PROCEDURE — 700102 HCHG RX REV CODE 250 W/ 637 OVERRIDE(OP): Performed by: STUDENT IN AN ORGANIZED HEALTH CARE EDUCATION/TRAINING PROGRAM

## 2023-06-13 PROCEDURE — 82962 GLUCOSE BLOOD TEST: CPT | Mod: 91

## 2023-06-13 PROCEDURE — 83735 ASSAY OF MAGNESIUM: CPT

## 2023-06-13 PROCEDURE — 84145 PROCALCITONIN (PCT): CPT

## 2023-06-13 PROCEDURE — A9270 NON-COVERED ITEM OR SERVICE: HCPCS | Performed by: HOSPITALIST

## 2023-06-13 PROCEDURE — 85027 COMPLETE CBC AUTOMATED: CPT

## 2023-06-13 RX ORDER — BENZONATATE 100 MG/1
100 CAPSULE ORAL 3 TIMES DAILY PRN
Status: DISCONTINUED | OUTPATIENT
Start: 2023-06-13 | End: 2023-06-16 | Stop reason: HOSPADM

## 2023-06-13 RX ADMIN — INSULIN LISPRO 6 UNITS: 100 INJECTION, SOLUTION INTRAVENOUS; SUBCUTANEOUS at 06:23

## 2023-06-13 RX ADMIN — AMPICILLIN AND SULBACTAM 3 G: 1; 2 INJECTION, POWDER, FOR SOLUTION INTRAMUSCULAR; INTRAVENOUS at 04:48

## 2023-06-13 RX ADMIN — AMPICILLIN AND SULBACTAM 3 G: 1; 2 INJECTION, POWDER, FOR SOLUTION INTRAMUSCULAR; INTRAVENOUS at 23:55

## 2023-06-13 RX ADMIN — BENZONATATE 100 MG: 100 CAPSULE ORAL at 12:52

## 2023-06-13 RX ADMIN — Medication 3 MG: at 20:49

## 2023-06-13 RX ADMIN — INSULIN LISPRO 1 UNITS: 100 INJECTION, SOLUTION INTRAVENOUS; SUBCUTANEOUS at 06:23

## 2023-06-13 RX ADMIN — BUPROPION HYDROCHLORIDE 150 MG: 75 TABLET, FILM COATED ORAL at 05:00

## 2023-06-13 RX ADMIN — RIVAROXABAN 10 MG: 10 TABLET, FILM COATED ORAL at 18:43

## 2023-06-13 RX ADMIN — INSULIN GLARGINE-YFGN 12 UNITS: 100 INJECTION, SOLUTION SUBCUTANEOUS at 18:56

## 2023-06-13 RX ADMIN — INSULIN LISPRO 6 UNITS: 100 INJECTION, SOLUTION INTRAVENOUS; SUBCUTANEOUS at 13:33

## 2023-06-13 RX ADMIN — INSULIN LISPRO 1 UNITS: 100 INJECTION, SOLUTION INTRAVENOUS; SUBCUTANEOUS at 21:03

## 2023-06-13 RX ADMIN — AMPICILLIN AND SULBACTAM 3 G: 1; 2 INJECTION, POWDER, FOR SOLUTION INTRAMUSCULAR; INTRAVENOUS at 18:47

## 2023-06-13 RX ADMIN — HEPARIN SODIUM 5000 UNITS: 5000 INJECTION, SOLUTION INTRAVENOUS; SUBCUTANEOUS at 05:00

## 2023-06-13 RX ADMIN — AMPICILLIN AND SULBACTAM 3 G: 1; 2 INJECTION, POWDER, FOR SOLUTION INTRAMUSCULAR; INTRAVENOUS at 12:50

## 2023-06-13 ASSESSMENT — PAIN DESCRIPTION - PAIN TYPE: TYPE: ACUTE PAIN

## 2023-06-13 ASSESSMENT — FIBROSIS 4 INDEX
FIB4 SCORE: 0.4

## 2023-06-13 NOTE — DIETARY
"Nutrition services: Day 2 of admit.  Claudine Augustine is a 67 y.o. female with admitting DX of Severe sepsis.    Consult received for MST 2 with unsure weight loss and denies decreased PO intake.     Pt seen at bedside, in bed with emesis bag and reports nausea without vomiting. States this has been going on for 3 months and pt has eaten 50% of meals during this time. Usual weight is 165 lbs, which is 5 lbs (3%) weight loss in last 3 months which is not significant.     Assessment:  Height: 172.7 cm (5' 8\")  Weight: 72.8 kg (160 lb 7.9 oz) - bed scale  Body mass index is 24.4 kg/m²., BMI classification: normal  Diet/Intake: minced and moist with MTL, 1:1 feeds; only sips documented    Evaluation:   PMH of HTN, insulin-dependent diabetes, ankle fracture this year with subsequent osteomyelitis.   Pt reports eating 50% of meals for 3 months with 3% weight loss during this time.   Per weight hx, pt weighed 175 lbs on 4/8/23 and 191 lbs on 12/30/22, which is 31 lb/16.2% severe weight loss in ~ 6 months.   Pt failed CSE and then passed FEES 6/12, SLP recommends minced and moist diet with MTL.   Skin: No wounds, trace/dependent edema noted.   Labs: Na 134, glucose 110, BUN 25, GFR 54, AST 7, A1C 7.5  Meds: SSI, melatonin, zofran prn  Last BM: 6/11    Malnutrition Risk: Severe malnutrition in the context of chronic illness r/t N/V and dehydration as evidenced by eating 50% of meals for 3 months and 16.2% weight loss in 6 months.     Recommendations/Plan:  Diet per SLP. Will order magic cups with meals to promote adequate kcal and protein intake.   Encourage intake of meals and supplements.   Document intake of all meals and supplements as % taken in ADL's to provide interdisciplinary communication across all shifts.   Monitor weight.  Nutrition rep will continue to see patient for ongoing meal and snack preferences.     RD following.           "

## 2023-06-13 NOTE — CARE PLAN
Problem: Knowledge Deficit - Standard  Goal: Patient and family/care givers will demonstrate understanding of plan of care, disease process/condition, diagnostic tests and medications  Outcome: Progressing     Problem: Hemodynamics  Goal: Patient's hemodynamics, fluid balance and neurologic status will be stable or improve  Outcome: Progressing  Note: VSS, O2 requirements are 6L oxymask     Problem: Skin Integrity  Goal: Skin integrity is maintained or improved  Outcome: Progressing   The patient is Stable - Low risk of patient condition declining or worsening    Shift Goals  Clinical Goals: decreased oxygen demand, stable VS  Patient Goals: water  Family Goals: no family present at this time    Progress made toward(s) clinical / shift goals:  lowered O2 needs, VSS     Patient is not progressing towards the following goals:

## 2023-06-13 NOTE — CARE PLAN
The patient is Stable - Low risk of patient condition declining or worsening    Shift Goals  Clinical Goals: O2, and cough,  Patient Goals: drink fluids  Family Goals: CHRISTINE    Progress made toward(s) clinical / shift goals: Pt O2 needs are being weaned    Patient is not progressing towards the following goals:

## 2023-06-13 NOTE — PROGRESS NOTES
Hospital Medicine Daily Progress Note    Date of Service  6/13/2023    Chief Complaint  Claudine Augustine is a 67 y.o. female admitted 6/11/2023 with altered mental status.    Hospital Course  Ms. Augustine is a 67 y.o. female who presented 6/11/2023 with altered mental status.  She has a history of hypertension, insulin-dependent diabetes.  Patient had a history of an ankle fracture in January 2023 that subsequently developed osteomyelitis.  She was treated with vancomycin x 6 weeks. She was recently diagnosed with UTI 2 days ago at [facility].   Ms. Augustine is a resident at [facility].  She was found to have altered mental status and low blood pressure at outside facility.  She is currently AAO x2 and able to provide some history.  Due to her altered mental status and hypotension, she was brought into the ED for further evaluation.     In the ED, patient initially hypotensive and tachycardic, however blood pressure responded to fluids.  Pertinent labs include white blood cell count 23.2, hemoglobin 8.9, hyponatremia, YARITZA.  UA positive for UTI.  CT head negative for acute intracranial abnormality.  Chest x-ray showing diffuse ill-defined interstitial opacities, right greater than left, concerning for pneumonitis.  She was admitted to IMCU for close monitoring and she was started on IV antibiotics.  Her IV antibiotics were changed from from ceftriaxone to Unasyn on June 12, 2023 due to possible pneumonia.     Interval Problem Update  6/13: Ms. Augustine was evaluated and examined in the IMCU. She is on IV unasyn for pneumonia. Her Procalcitonin is elevated at 8.33 this morning. Blood cultures are negative. She knows that she was at [facility] but  does not know the year. She thinks she is 65 years old. She is on 9 liters of oxygen.    I have discussed this patient's plan of care and discharge plan at IDT rounds today with Case Management, Nursing, Nursing leadership, and other members of the IDT  team.    Consultants/Specialty  none    Code Status  Full Code    Disposition  The patient is not medically cleared for discharge to home or a post-acute facility.  Anticipate discharge to: skilled nursing facility    I have placed the appropriate orders for post-discharge needs.    Review of Systems  Review of Systems   Unable to perform ROS: Mental acuity        Physical Exam  Temp:  [36.1 °C (97 °F)-38.1 °C (100.6 °F)] 36.1 °C (97 °F)  Pulse:  [] 89  Resp:  [19-38] 20  BP: (107-164)/(59-78) 153/68  SpO2:  [91 %-98 %] 95 %    Physical Exam  Vitals and nursing note reviewed.   Constitutional:       General: She is not in acute distress.     Appearance: She is ill-appearing.   HENT:      Mouth/Throat:      Mouth: Mucous membranes are dry.   Cardiovascular:      Rate and Rhythm: Normal rate and regular rhythm.      Heart sounds: No murmur heard.  Pulmonary:      Comments: 9 liters of oxygen  Normal work of breathing  Few crackles bases clear apices  Abdominal:      General: There is no distension.      Tenderness: There is no abdominal tenderness.   Musculoskeletal:      Cervical back: Normal range of motion and neck supple.      Right lower leg: No edema.      Left lower leg: No edema.   Skin:     General: Skin is dry.      Coloration: Skin is pale.   Neurological:      General: No focal deficit present.   Psychiatric:         Behavior: Behavior normal.         Fluids    Intake/Output Summary (Last 24 hours) at 6/13/2023 0733  Last data filed at 6/13/2023 0528  Gross per 24 hour   Intake 459.48 ml   Output 950 ml   Net -490.52 ml       Laboratory  Recent Labs     06/11/23  1755 06/12/23  0315 06/13/23  0303   WBC 23.2* 19.8* 15.4*   RBC 3.32* 3.12* 3.14*   HEMOGLOBIN 8.9* 8.3* 8.3*   HEMATOCRIT 26.7* 25.9* 25.7*   MCV 80.4* 83.0 81.8   MCH 26.8* 26.6* 26.4*   MCHC 33.3 32.0* 32.3   RDW 46.7 48.2 47.0   PLATELETCT 474* 470* 483*   MPV 8.4* 8.8* 8.5*     Recent Labs     06/11/23  1755 06/12/23  0310  06/13/23  0303   SODIUM 128* 136 134*   POTASSIUM 4.5 4.3 3.6   CHLORIDE 95* 102 101   CO2 16* 18* 19*   GLUCOSE 193* 179* 110*   BUN 46* 36* 25*   CREATININE 2.91* 1.77* 1.12   CALCIUM 8.6 8.6 8.5                   Imaging  EC-ECHOCARDIOGRAM COMPLETE W/O CONT   Final Result      DX-CHEST-LIMITED (1 VIEW)   Final Result         1.  Pulmonary edema and/or infiltrates are identified, which are stable since the prior exam.   2.  Atherosclerosis      CT-HEAD W/O   Final Result      1.  No acute intracranial process.   2.  There is diffuse cerebral atrophy.   3.  There is low attenuation in the periventricular white matter most consistent with chronic small vessel ischemic change, although gliosis and demyelination could have this appearance.         DX-CHEST-PORTABLE (1 VIEW)   Final Result      1.  Diffuse ill-defined right greater than left interstitial opacities suspicious for pneumonitis favored over edema.           Assessment/Plan  * Severe sepsis (HCC)- (present on admission)  Assessment & Plan  This is Severe Sepsis Present on admission  SIRS criteria identified on my evaluation include: Fever, with temperature greater than 100.9 deg F, Tachycardia, with heart rate greater than 90 BPM and Leukocytosis, with WBC greater than 12,000  Clinical indicators of end organ dysfunction include Systolic blood pressure (SBP) <90 mmHg or mean arterial pressure <65 mmHg  Source is pneumonia.  Sepsis protocol initiated    IV antibiotics as appropriate for source of sepsisCrystalloid Fluid Administration: Fluid resuscitation was given.  Reassessment: I have reassessed the patient's hemodynamic status        Pneumonia- (present on admission)  Assessment & Plan  Noted on chest xray and Procalcitonin is 8.3  IV Unasyn    Acute respiratory failure with hypoxia (HCC)- (present on admission)  Assessment & Plan  Secondary to pneumonia  Supplemental oxygen at 6 liters to maintain adequate sats.  Echocardiogram is  normal.    Encephalopathy acute- (present on admission)  Assessment & Plan  Acute metabolic encephalopathy secondary to infection.  CT head is negative for acute processes.     DM (diabetes mellitus) ([facility])- (present on admission)  Assessment & Plan  With hyperglycemia.  Last HbA1c in December was 11.2 and now is 7.5  Continue seeing sliding scale with hypoglycemia protocol.  She had been on Lantus 28 units of Lantus prior to admission which was reduced due to poor oral intake and will be increased as tolerated.  Continue to monitor.      YARITZA (acute kidney injury) ([facility])- (present on admission)  Assessment & Plan  Consistent with dehydration in the setting of sepsis  resolved         VTE prophylaxis: Xarelto 10 mg daily as prophylaxis    I have performed a physical exam and reviewed and updated ROS and Plan today (6/13/2023). In review of yesterday's note (6/12/2023), there are no changes except as documented above.

## 2023-06-13 NOTE — PROGRESS NOTES
4 Eyes Skin Assessment Completed by GUADALUPE Quick and GUADALUPE Woody.    Head WDL  Ears Redness right ear    Nose WDL  Mouth WDL  Neck WDL  Breast/Chest WDL  Shoulder Blades WDL  Spine WDL  (R) Arm/Elbow/Hand WDL  (L) Arm/Elbow/Hand WDL  Abdomen Scar  Groin WDL  Scrotum/Coccyx/Buttocks Redness and Blanching slow blanching    (R) Leg Scar knee abrasion scab.  (L) Leg Scar  (R) Heel/Foot/Toe Scar  (L) Heel/Foot/Toe WDL          Devices In Places Tele Box and Pulse Ox, Nasal Cannula      Interventions In Place Gray Ear Foams, waffle overlay.    Possible Skin Injury No    Pictures Uploaded Into Epic Yes  Wound Consult Placed N/A  RN Wound Prevention Protocol Ordered Yes

## 2023-06-14 ENCOUNTER — APPOINTMENT (OUTPATIENT)
Dept: RADIOLOGY | Facility: MEDICAL CENTER | Age: 68
DRG: 871 | End: 2023-06-14
Attending: STUDENT IN AN ORGANIZED HEALTH CARE EDUCATION/TRAINING PROGRAM
Payer: MEDICARE

## 2023-06-14 LAB
ANION GAP SERPL CALC-SCNC: 13 MMOL/L (ref 7–16)
BASOPHILS # BLD AUTO: 0.3 % (ref 0–1.8)
BASOPHILS # BLD: 0.04 K/UL (ref 0–0.12)
BUN SERPL-MCNC: 13 MG/DL (ref 8–22)
CALCIUM SERPL-MCNC: 8.3 MG/DL (ref 8.5–10.5)
CHLORIDE SERPL-SCNC: 98 MMOL/L (ref 96–112)
CO2 SERPL-SCNC: 21 MMOL/L (ref 20–33)
CREAT SERPL-MCNC: 0.88 MG/DL (ref 0.5–1.4)
EOSINOPHIL # BLD AUTO: 0.35 K/UL (ref 0–0.51)
EOSINOPHIL NFR BLD: 2.6 % (ref 0–6.9)
ERYTHROCYTE [DISTWIDTH] IN BLOOD BY AUTOMATED COUNT: 46.2 FL (ref 35.9–50)
GFR SERPLBLD CREATININE-BSD FMLA CKD-EPI: 72 ML/MIN/1.73 M 2
GLUCOSE BLD STRIP.AUTO-MCNC: 145 MG/DL (ref 65–99)
GLUCOSE BLD STRIP.AUTO-MCNC: 168 MG/DL (ref 65–99)
GLUCOSE BLD STRIP.AUTO-MCNC: 194 MG/DL (ref 65–99)
GLUCOSE BLD STRIP.AUTO-MCNC: 208 MG/DL (ref 65–99)
GLUCOSE SERPL-MCNC: 170 MG/DL (ref 65–99)
HCT VFR BLD AUTO: 24.9 % (ref 37–47)
HGB BLD-MCNC: 8.1 G/DL (ref 12–16)
IMM GRANULOCYTES # BLD AUTO: 0.37 K/UL (ref 0–0.11)
IMM GRANULOCYTES NFR BLD AUTO: 2.8 % (ref 0–0.9)
LYMPHOCYTES # BLD AUTO: 1.6 K/UL (ref 1–4.8)
LYMPHOCYTES NFR BLD: 12 % (ref 22–41)
MCH RBC QN AUTO: 26.4 PG (ref 27–33)
MCHC RBC AUTO-ENTMCNC: 32.5 G/DL (ref 32.2–35.5)
MCV RBC AUTO: 81.1 FL (ref 81.4–97.8)
MONOCYTES # BLD AUTO: 1.15 K/UL (ref 0–0.85)
MONOCYTES NFR BLD AUTO: 8.6 % (ref 0–13.4)
NEUTROPHILS # BLD AUTO: 9.82 K/UL (ref 1.82–7.42)
NEUTROPHILS NFR BLD: 73.7 % (ref 44–72)
NRBC # BLD AUTO: 0 K/UL
NRBC BLD-RTO: 0 /100 WBC (ref 0–0.2)
PLATELET # BLD AUTO: 480 K/UL (ref 164–446)
PMV BLD AUTO: 8.4 FL (ref 9–12.9)
POTASSIUM SERPL-SCNC: 3.7 MMOL/L (ref 3.6–5.5)
PROCALCITONIN SERPL-MCNC: 3.6 NG/ML
RBC # BLD AUTO: 3.07 M/UL (ref 4.2–5.4)
SODIUM SERPL-SCNC: 132 MMOL/L (ref 135–145)
WBC # BLD AUTO: 13.3 K/UL (ref 4.8–10.8)

## 2023-06-14 PROCEDURE — 700102 HCHG RX REV CODE 250 W/ 637 OVERRIDE(OP): Performed by: STUDENT IN AN ORGANIZED HEALTH CARE EDUCATION/TRAINING PROGRAM

## 2023-06-14 PROCEDURE — 82962 GLUCOSE BLOOD TEST: CPT

## 2023-06-14 PROCEDURE — 770020 HCHG ROOM/CARE - TELE (206)

## 2023-06-14 PROCEDURE — 92526 ORAL FUNCTION THERAPY: CPT

## 2023-06-14 PROCEDURE — 36415 COLL VENOUS BLD VENIPUNCTURE: CPT

## 2023-06-14 PROCEDURE — A9270 NON-COVERED ITEM OR SERVICE: HCPCS | Performed by: STUDENT IN AN ORGANIZED HEALTH CARE EDUCATION/TRAINING PROGRAM

## 2023-06-14 PROCEDURE — 700105 HCHG RX REV CODE 258: Performed by: INTERNAL MEDICINE

## 2023-06-14 PROCEDURE — 71045 X-RAY EXAM CHEST 1 VIEW: CPT

## 2023-06-14 PROCEDURE — 84145 PROCALCITONIN (PCT): CPT

## 2023-06-14 PROCEDURE — 700111 HCHG RX REV CODE 636 W/ 250 OVERRIDE (IP): Performed by: STUDENT IN AN ORGANIZED HEALTH CARE EDUCATION/TRAINING PROGRAM

## 2023-06-14 PROCEDURE — 700111 HCHG RX REV CODE 636 W/ 250 OVERRIDE (IP): Performed by: INTERNAL MEDICINE

## 2023-06-14 PROCEDURE — A9270 NON-COVERED ITEM OR SERVICE: HCPCS | Performed by: HOSPITALIST

## 2023-06-14 PROCEDURE — 700102 HCHG RX REV CODE 250 W/ 637 OVERRIDE(OP): Performed by: HOSPITALIST

## 2023-06-14 PROCEDURE — 99233 SBSQ HOSP IP/OBS HIGH 50: CPT | Performed by: STUDENT IN AN ORGANIZED HEALTH CARE EDUCATION/TRAINING PROGRAM

## 2023-06-14 PROCEDURE — 80048 BASIC METABOLIC PNL TOTAL CA: CPT

## 2023-06-14 PROCEDURE — 85025 COMPLETE CBC W/AUTO DIFF WBC: CPT

## 2023-06-14 RX ORDER — AMOXICILLIN AND CLAVULANATE POTASSIUM 875; 125 MG/1; MG/1
1 TABLET, FILM COATED ORAL EVERY 12 HOURS
Status: DISCONTINUED | OUTPATIENT
Start: 2023-06-14 | End: 2023-06-16 | Stop reason: HOSPADM

## 2023-06-14 RX ORDER — FUROSEMIDE 10 MG/ML
40 INJECTION INTRAMUSCULAR; INTRAVENOUS
Status: COMPLETED | OUTPATIENT
Start: 2023-06-14 | End: 2023-06-15

## 2023-06-14 RX ADMIN — INSULIN LISPRO 6 UNITS: 100 INJECTION, SOLUTION INTRAVENOUS; SUBCUTANEOUS at 17:24

## 2023-06-14 RX ADMIN — AMOXICILLIN AND CLAVULANATE POTASSIUM 1 TABLET: 875; 125 TABLET, FILM COATED ORAL at 17:16

## 2023-06-14 RX ADMIN — INSULIN LISPRO 2 UNITS: 100 INJECTION, SOLUTION INTRAVENOUS; SUBCUTANEOUS at 21:05

## 2023-06-14 RX ADMIN — INSULIN GLARGINE-YFGN 12 UNITS: 100 INJECTION, SOLUTION SUBCUTANEOUS at 17:25

## 2023-06-14 RX ADMIN — Medication 3 MG: at 20:59

## 2023-06-14 RX ADMIN — ONDANSETRON 4 MG: 2 INJECTION INTRAMUSCULAR; INTRAVENOUS at 08:52

## 2023-06-14 RX ADMIN — ONDANSETRON 4 MG: 2 INJECTION INTRAMUSCULAR; INTRAVENOUS at 04:18

## 2023-06-14 RX ADMIN — BENZONATATE 100 MG: 100 CAPSULE ORAL at 17:17

## 2023-06-14 RX ADMIN — AMPICILLIN AND SULBACTAM 3 G: 1; 2 INJECTION, POWDER, FOR SOLUTION INTRAMUSCULAR; INTRAVENOUS at 04:21

## 2023-06-14 RX ADMIN — FUROSEMIDE 40 MG: 10 INJECTION INTRAMUSCULAR; INTRAVENOUS at 15:06

## 2023-06-14 RX ADMIN — RIVAROXABAN 10 MG: 10 TABLET, FILM COATED ORAL at 17:18

## 2023-06-14 RX ADMIN — INSULIN LISPRO 1 UNITS: 100 INJECTION, SOLUTION INTRAVENOUS; SUBCUTANEOUS at 12:42

## 2023-06-14 RX ADMIN — ACETAMINOPHEN 650 MG: 325 TABLET, FILM COATED ORAL at 17:19

## 2023-06-14 RX ADMIN — INSULIN LISPRO 6 UNITS: 100 INJECTION, SOLUTION INTRAVENOUS; SUBCUTANEOUS at 08:50

## 2023-06-14 RX ADMIN — INSULIN LISPRO 6 UNITS: 100 INJECTION, SOLUTION INTRAVENOUS; SUBCUTANEOUS at 12:41

## 2023-06-14 RX ADMIN — BENZONATATE 100 MG: 100 CAPSULE ORAL at 21:01

## 2023-06-14 RX ADMIN — ACETAMINOPHEN 650 MG: 325 TABLET, FILM COATED ORAL at 23:19

## 2023-06-14 RX ADMIN — INSULIN LISPRO 1 UNITS: 100 INJECTION, SOLUTION INTRAVENOUS; SUBCUTANEOUS at 08:51

## 2023-06-14 RX ADMIN — BUPROPION HYDROCHLORIDE 150 MG: 75 TABLET, FILM COATED ORAL at 04:19

## 2023-06-14 RX ADMIN — AMPICILLIN AND SULBACTAM 3 G: 1; 2 INJECTION, POWDER, FOR SOLUTION INTRAMUSCULAR; INTRAVENOUS at 12:47

## 2023-06-14 ASSESSMENT — ENCOUNTER SYMPTOMS
SHORTNESS OF BREATH: 1
COUGH: 1

## 2023-06-14 ASSESSMENT — PAIN DESCRIPTION - PAIN TYPE
TYPE: ACUTE PAIN
TYPE: ACUTE PAIN

## 2023-06-14 ASSESSMENT — FIBROSIS 4 INDEX: FIB4 SCORE: 0.4

## 2023-06-14 NOTE — PROGRESS NOTES
Hospital Medicine Daily Progress Note    Date of Service  6/14/2023    Chief Complaint  Claudine Augustine is a 67 y.o. female admitted 6/11/2023 with altered mental status    Hospital Course    Ms. Augustine is a 67 y.o. female with past medical history of hypertension, diabetes who presented 6/11/2023 with altered mental status.  Patient had a history of an ankle fracture in January 2023 that subsequently developed osteomyelitis.  She was treated with vancomycin x 6 weeks. She was recently diagnosed with UTI 2 days ago at [facility].   Ms. Augustine is a resident at [facility].  She was found to have altered mental status and low blood pressure at outside facility.  She is currently AAO x2 and able to provide some history.  Due to her altered mental status and hypotension, she was brought into the ED for further evaluation.     In the ED, patient initially hypotensive and tachycardic, however blood pressure responded to fluids.  Pertinent labs include white blood cell count 23.2, hemoglobin 8.9, hyponatremia, YARITZA.  UA positive for UTI.  CT head negative for acute intracranial abnormality.  Chest x-ray showing diffuse ill-defined interstitial opacities, right greater than left, concerning for pneumonitis.  She was admitted to [facility] for close monitoring and she was started on IV antibiotics.  Her IV antibiotics were changed from from ceftriaxone to Unasyn on June 12, 2023 due to possible pneumonia.       Interval Problem Update  6/14/2023  BP still, requiring 6 L oxygen to maintain saturations over 90  Remained asymptomatic  Patient is oriented x2  Labs reviewed, leukocytosis improving, H&H remained stable.Mild hyponatremia, procalcitonin trended down, blood culture remain negative.      Continue to monitor oxygen requirement  Continue IV antibiotics  Incentive spirometry  Repeat x-ray    Repeat BMP in a.m. to monitor electrolytes Repeat CBC in a.m. to monitor white count and hemoglobin     Patient's is critically ill given underlying  pneumonia and acute hyper respite failure.  Require close telemetry monitoring.      I have discussed this patient's plan of care and discharge plan at IDT rounds today with Case Management, Nursing, Nursing leadership, and other members of the IDT team.    Consultants/Specialty  critical care    Code Status  Full Code    Disposition  The patient is not medically cleared for discharge to home or a post-acute facility.  Anticipate discharge to: skilled nursing facility    I have placed the appropriate orders for post-discharge needs.    Review of Systems  Review of Systems   Constitutional:  Positive for malaise/fatigue.   Respiratory:  Positive for cough and shortness of breath.         Physical Exam  Temp:  [36.1 °C (97 °F)-36.7 °C (98.1 °F)] 36.7 °C (98.1 °F)  Pulse:  [85-97] 89  Resp:  [16-20] 18  BP: (121-152)/(74-83) 134/81  SpO2:  [95 %-98 %] 95 %    Physical Exam  Constitutional:       Appearance: She is ill-appearing.   Eyes:      Pupils: Pupils are equal, round, and reactive to light.   Cardiovascular:      Rate and Rhythm: Normal rate and regular rhythm.   Musculoskeletal:      Right lower leg: No edema.      Left lower leg: No edema.         Fluids  No intake or output data in the 24 hours ending 06/14/23 1101    Laboratory  Recent Labs     06/12/23 0315 06/13/23  0303 06/14/23  0815   WBC 19.8* 15.4* 13.3*   RBC 3.12* 3.14* 3.07*   HEMOGLOBIN 8.3* 8.3* 8.1*   HEMATOCRIT 25.9* 25.7* 24.9*   MCV 83.0 81.8 81.1*   MCH 26.6* 26.4* 26.4*   MCHC 32.0* 32.3 32.5   RDW 48.2 47.0 46.2   PLATELETCT 470* 483* 480*   MPV 8.8* 8.5* 8.4*     Recent Labs     06/12/23 0315 06/13/23  0303 06/14/23  0815   SODIUM 136 134* 132*   POTASSIUM 4.3 3.6 3.7   CHLORIDE 102 101 98   CO2 18* 19* 21   GLUCOSE 179* 110* 170*   BUN 36* 25* 13   CREATININE 1.77* 1.12 0.88   CALCIUM 8.6 8.5 8.3*                   Imaging  EC-ECHOCARDIOGRAM COMPLETE W/O CONT   Final Result      DX-CHEST-LIMITED (1 VIEW)   Final Result         1.   Pulmonary edema and/or infiltrates are identified, which are stable since the prior exam.   2.  Atherosclerosis      CT-HEAD W/O   Final Result      1.  No acute intracranial process.   2.  There is diffuse cerebral atrophy.   3.  There is low attenuation in the periventricular white matter most consistent with chronic small vessel ischemic change, although gliosis and demyelination could have this appearance.         DX-CHEST-PORTABLE (1 VIEW)   Final Result      1.  Diffuse ill-defined right greater than left interstitial opacities suspicious for pneumonitis favored over edema.      DX-CHEST-LIMITED (1 VIEW)    (Results Pending)        Assessment/Plan  * Severe sepsis (HCC)- (present on admission)  Assessment & Plan  This is Severe Sepsis Present on admission  SIRS criteria identified on my evaluation include: Fever, with temperature greater than 100.9 deg F, Tachycardia, with heart rate greater than 90 BPM and Leukocytosis, with WBC greater than 12,000  Clinical indicators of end organ dysfunction include Systolic blood pressure (SBP) <90 mmHg or mean arterial pressure <65 mmHg  Source is pneumonia.  Sepsis protocol initiated    IV antibiotics as appropriate for source of sepsisCrystalloid Fluid Administration: Fluid resuscitation was given.  Reassessment: I have reassessed the patient's hemodynamic status        Pneumonia- (present on admission)  Assessment & Plan  Noted on chest xray and Procalcitonin is 8.3  IV Unasyn    Encephalopathy acute- (present on admission)  Assessment & Plan  Acute metabolic encephalopathy secondary to infection.  CT head is negative for acute processes.     Acute respiratory failure with hypoxia (HCC)- (present on admission)  Assessment & Plan  Secondary to pneumonia  Supplemental oxygen at 6 liters to maintain adequate sats.  Echocardiogram is normal.    DM (diabetes mellitus) (HCC)- (present on admission)  Assessment & Plan  With hyperglycemia.  Last HbA1c in December was 11.2 and  now is 7.5  Continue seeing sliding scale with hypoglycemia protocol.  She had been on Lantus 28 units of Lantus prior to admission which was reduced due to poor oral intake and will be increased as tolerated.  Continue to monitor.      YARITZA (acute kidney injury) (HCC)- (present on admission)  Assessment & Plan  Consistent with dehydration in the setting of sepsis  resolved         VTE prophylaxis: SCDs/TEDs and Xarelto 10 mg daily as prophylaxis    I have performed a physical exam and reviewed and updated ROS and Plan today (6/14/2023). In review of yesterday's note (6/13/2023), there are no changes except as documented above.

## 2023-06-14 NOTE — PROGRESS NOTES
Patient made NPO due to strong cough after eating and drinking mildly thick liquids. This RN reached out to speech and MD notified.

## 2023-06-14 NOTE — DISCHARGE PLANNING
Discussed patient in IDT and chart reviewed.  She is a resident at [facility].  They have accepted her back and will start pursing insurance auth today for anticipated medical stability to return 6/16/2023.

## 2023-06-14 NOTE — CARE PLAN
The patient is Watcher - Medium risk of patient condition declining or worsening    Shift Goals  Clinical Goals: monitor vs, antibiotic therapy  Patient Goals: sleep, eat  Family Goals: CHRISTINE    Progress made toward(s) clinical / shift goals:   pt educated and able to assist and reposition self in bed frequently while awake, barrier cream in place, no new evidence of skin breakdown noted during this writer's shift.    Patient is not progressing towards the following goals:

## 2023-06-14 NOTE — PROGRESS NOTES
Assumed care of patient from day shift RN at 1810, Patient AOX4, VSS.   Fall education reinforced, call bell within reach, bed locked and in lowest position. POC discussed and all questions answered.  Purposeful and or hourly rounding in place.

## 2023-06-14 NOTE — THERAPY
"Speech Language Pathology   Daily Treatment     Patient Name: Claudine Augustine  AGE:  67 y.o., SEX:  female  Medical Record #: [id number]  Date of Service: 6/14/2023      Precautions:  Precautions: Fall Risk, Swallow Precautions         Subjective  Pt agreeable and cooperative with SLP tx tasks. RN reports that pt has been coughing and choking with sips of MT2 water. \"I like Chinese food but not for breakfast.\"      Assessment  Pt seen for dysphagia management. Dry, hacking cough prior to PO trials with SLP. Pt using [employer]. Reviewed results of FEES with pt who did not report recall. Pt then provided with 8oz of MT2 water via cup with 1:1 spv. Pt required min cues to complete small sips strategy. Single instance of dry, hacking cough over 8oz. Per FEES - pt did not have airway with MT2 but her cough was effective to clear aspirated material with TN0 and secretions from airway; suspect cough strength can clear trace aspirate of MT2 should it have occurred.     Pt then completed swallow exercises targeting deficits noted on FEES. Pt completed effortful swallow x20 with good to fair accuracy and good effort. Attempted Marika x5 with good effort and fair to poor accuracy.     Review swallow strategies and supervised pt completing oral care (toothbrush, toothpaste, rinse with mouthwash).       Clinical Impressions  Will default to recommendations from FEES on 6/12. Pt will likely continue to benefit from exercise-based and behavioral swallow rehabilitation. Swallow outcomes can be further maximized with use of frequent, thorough oral care and mobilization as pt is able.         Recommendations  Minced and moist with mildly thick liquids  Instrumentation: Instrumental swallow study pending clinical progress  Medication: As tolerated  Supervision: 1:1 feeding with constant supervision, Encourage self-feeding  Positioning: Fully upright and midline during oral intake  Oral Care: Pre- and post-meals        SLP Treatment " "Plan  Treatment Plan: Dysphagia Treatment, Patient/Family/Caregiver Training  SLP Frequency: 3x Per Week  Estimated Duration: Until Therapy Goals Met      Anticipated Discharge Needs  Discharge Recommendations: Recommend post-acute placement for additional speech therapy services prior to discharge home  Therapy Recommendations Upon DC: Dysphagia Training, Patient / Family / Caregiver Education, Community Re-Integration      Patient / Family Goals  Patient / Family Goal #1: \"Do I get to eat?\"  Goal #1 Outcome: Progressing as expected  Short Term Goals  Short Term Goal # 1: Pt will participate in FEES with SLP to further evaluate swallow function and inform POC.  Goal Outcome # 1: Goal met, new goal added  Short Term Goal # 1 B : Pt will consume diet of MM5/MT2 given strategy use with no overt s/sx of aspiration or worsening of lung status.  Goal Outcome  # 1 B: Progressing slower than expected  Short Term Goal # 2: Pt will complete exercises targeting BOT retraction, LVC, epiglottic inversion, pharyngeal shortening, and pharyngeal constriction x20 in a session given mod cues from SLP with \"good\" accuracy.  Goal Outcome # 2 : Progressing as expected      JADA Colindres  "

## 2023-06-15 LAB
ANION GAP SERPL CALC-SCNC: 13 MMOL/L (ref 7–16)
ANION GAP SERPL CALC-SCNC: 14 MMOL/L (ref 7–16)
BASOPHILS # BLD AUTO: 0.5 % (ref 0–1.8)
BASOPHILS # BLD: 0.08 K/UL (ref 0–0.12)
BUN SERPL-MCNC: 15 MG/DL (ref 8–22)
BUN SERPL-MCNC: 17 MG/DL (ref 8–22)
CALCIUM SERPL-MCNC: 8.2 MG/DL (ref 8.5–10.5)
CALCIUM SERPL-MCNC: 8.4 MG/DL (ref 8.5–10.5)
CHLORIDE SERPL-SCNC: 96 MMOL/L (ref 96–112)
CHLORIDE SERPL-SCNC: 97 MMOL/L (ref 96–112)
CO2 SERPL-SCNC: 20 MMOL/L (ref 20–33)
CO2 SERPL-SCNC: 21 MMOL/L (ref 20–33)
CREAT SERPL-MCNC: 1.01 MG/DL (ref 0.5–1.4)
CREAT SERPL-MCNC: 1.05 MG/DL (ref 0.5–1.4)
EOSINOPHIL # BLD AUTO: 0.47 K/UL (ref 0–0.51)
EOSINOPHIL NFR BLD: 3 % (ref 0–6.9)
ERYTHROCYTE [DISTWIDTH] IN BLOOD BY AUTOMATED COUNT: 46.7 FL (ref 35.9–50)
GFR SERPLBLD CREATININE-BSD FMLA CKD-EPI: 58 ML/MIN/1.73 M 2
GFR SERPLBLD CREATININE-BSD FMLA CKD-EPI: 61 ML/MIN/1.73 M 2
GLUCOSE BLD STRIP.AUTO-MCNC: 187 MG/DL (ref 65–99)
GLUCOSE BLD STRIP.AUTO-MCNC: 206 MG/DL (ref 65–99)
GLUCOSE BLD STRIP.AUTO-MCNC: 213 MG/DL (ref 65–99)
GLUCOSE BLD STRIP.AUTO-MCNC: 233 MG/DL (ref 65–99)
GLUCOSE SERPL-MCNC: 168 MG/DL (ref 65–99)
GLUCOSE SERPL-MCNC: 291 MG/DL (ref 65–99)
HCT VFR BLD AUTO: 25.8 % (ref 37–47)
HGB BLD-MCNC: 8.3 G/DL (ref 12–16)
IMM GRANULOCYTES # BLD AUTO: 0.62 K/UL (ref 0–0.11)
IMM GRANULOCYTES NFR BLD AUTO: 4 % (ref 0–0.9)
LYMPHOCYTES # BLD AUTO: 2.18 K/UL (ref 1–4.8)
LYMPHOCYTES NFR BLD: 14.1 % (ref 22–41)
MCH RBC QN AUTO: 26.6 PG (ref 27–33)
MCHC RBC AUTO-ENTMCNC: 32.2 G/DL (ref 32.2–35.5)
MCV RBC AUTO: 82.7 FL (ref 81.4–97.8)
MONOCYTES # BLD AUTO: 1.15 K/UL (ref 0–0.85)
MONOCYTES NFR BLD AUTO: 7.5 % (ref 0–13.4)
NEUTROPHILS # BLD AUTO: 10.91 K/UL (ref 1.82–7.42)
NEUTROPHILS NFR BLD: 70.9 % (ref 44–72)
NRBC # BLD AUTO: 0 K/UL
NRBC BLD-RTO: 0 /100 WBC (ref 0–0.2)
PLATELET # BLD AUTO: 484 K/UL (ref 164–446)
PMV BLD AUTO: 8.4 FL (ref 9–12.9)
POTASSIUM SERPL-SCNC: 3.7 MMOL/L (ref 3.6–5.5)
POTASSIUM SERPL-SCNC: 3.9 MMOL/L (ref 3.6–5.5)
PROCALCITONIN SERPL-MCNC: 2.39 NG/ML
RBC # BLD AUTO: 3.12 M/UL (ref 4.2–5.4)
SODIUM SERPL-SCNC: 129 MMOL/L (ref 135–145)
SODIUM SERPL-SCNC: 132 MMOL/L (ref 135–145)
WBC # BLD AUTO: 15.4 K/UL (ref 4.8–10.8)

## 2023-06-15 PROCEDURE — A9270 NON-COVERED ITEM OR SERVICE: HCPCS | Performed by: STUDENT IN AN ORGANIZED HEALTH CARE EDUCATION/TRAINING PROGRAM

## 2023-06-15 PROCEDURE — 85025 COMPLETE CBC W/AUTO DIFF WBC: CPT

## 2023-06-15 PROCEDURE — 700102 HCHG RX REV CODE 250 W/ 637 OVERRIDE(OP): Performed by: STUDENT IN AN ORGANIZED HEALTH CARE EDUCATION/TRAINING PROGRAM

## 2023-06-15 PROCEDURE — 700102 HCHG RX REV CODE 250 W/ 637 OVERRIDE(OP): Performed by: HOSPITALIST

## 2023-06-15 PROCEDURE — 99233 SBSQ HOSP IP/OBS HIGH 50: CPT | Performed by: STUDENT IN AN ORGANIZED HEALTH CARE EDUCATION/TRAINING PROGRAM

## 2023-06-15 PROCEDURE — 36415 COLL VENOUS BLD VENIPUNCTURE: CPT

## 2023-06-15 PROCEDURE — 80048 BASIC METABOLIC PNL TOTAL CA: CPT

## 2023-06-15 PROCEDURE — 84145 PROCALCITONIN (PCT): CPT

## 2023-06-15 PROCEDURE — 97530 THERAPEUTIC ACTIVITIES: CPT

## 2023-06-15 PROCEDURE — A9270 NON-COVERED ITEM OR SERVICE: HCPCS | Performed by: HOSPITALIST

## 2023-06-15 PROCEDURE — 770020 HCHG ROOM/CARE - TELE (206)

## 2023-06-15 PROCEDURE — 82962 GLUCOSE BLOOD TEST: CPT

## 2023-06-15 PROCEDURE — 700111 HCHG RX REV CODE 636 W/ 250 OVERRIDE (IP): Performed by: STUDENT IN AN ORGANIZED HEALTH CARE EDUCATION/TRAINING PROGRAM

## 2023-06-15 RX ORDER — FUROSEMIDE 10 MG/ML
40 INJECTION INTRAMUSCULAR; INTRAVENOUS ONCE
Status: COMPLETED | OUTPATIENT
Start: 2023-06-15 | End: 2023-06-15

## 2023-06-15 RX ADMIN — FUROSEMIDE 40 MG: 10 INJECTION INTRAMUSCULAR; INTRAVENOUS at 15:31

## 2023-06-15 RX ADMIN — BENZONATATE 100 MG: 100 CAPSULE ORAL at 05:25

## 2023-06-15 RX ADMIN — BUPROPION HYDROCHLORIDE 150 MG: 75 TABLET, FILM COATED ORAL at 05:25

## 2023-06-15 RX ADMIN — INSULIN LISPRO 6 UNITS: 100 INJECTION, SOLUTION INTRAVENOUS; SUBCUTANEOUS at 17:28

## 2023-06-15 RX ADMIN — ONDANSETRON 4 MG: 2 INJECTION INTRAMUSCULAR; INTRAVENOUS at 12:28

## 2023-06-15 RX ADMIN — INSULIN GLARGINE-YFGN 12 UNITS: 100 INJECTION, SOLUTION SUBCUTANEOUS at 17:30

## 2023-06-15 RX ADMIN — INSULIN LISPRO 1 UNITS: 100 INJECTION, SOLUTION INTRAVENOUS; SUBCUTANEOUS at 09:33

## 2023-06-15 RX ADMIN — AMOXICILLIN AND CLAVULANATE POTASSIUM 1 TABLET: 875; 125 TABLET, FILM COATED ORAL at 17:28

## 2023-06-15 RX ADMIN — INSULIN LISPRO 2 UNITS: 100 INJECTION, SOLUTION INTRAVENOUS; SUBCUTANEOUS at 13:02

## 2023-06-15 RX ADMIN — RIVAROXABAN 10 MG: 10 TABLET, FILM COATED ORAL at 17:28

## 2023-06-15 RX ADMIN — INSULIN LISPRO 2 UNITS: 100 INJECTION, SOLUTION INTRAVENOUS; SUBCUTANEOUS at 20:48

## 2023-06-15 RX ADMIN — INSULIN LISPRO 6 UNITS: 100 INJECTION, SOLUTION INTRAVENOUS; SUBCUTANEOUS at 09:34

## 2023-06-15 RX ADMIN — Medication 3 MG: at 20:46

## 2023-06-15 RX ADMIN — INSULIN LISPRO 2 UNITS: 100 INJECTION, SOLUTION INTRAVENOUS; SUBCUTANEOUS at 17:30

## 2023-06-15 RX ADMIN — INSULIN LISPRO 6 UNITS: 100 INJECTION, SOLUTION INTRAVENOUS; SUBCUTANEOUS at 13:02

## 2023-06-15 RX ADMIN — AMOXICILLIN AND CLAVULANATE POTASSIUM 1 TABLET: 875; 125 TABLET, FILM COATED ORAL at 05:25

## 2023-06-15 RX ADMIN — FUROSEMIDE 40 MG: 10 INJECTION INTRAMUSCULAR; INTRAVENOUS at 05:25

## 2023-06-15 ASSESSMENT — GAIT ASSESSMENTS: GAIT LEVEL OF ASSIST: UNABLE TO PARTICIPATE

## 2023-06-15 ASSESSMENT — COGNITIVE AND FUNCTIONAL STATUS - GENERAL
WALKING IN HOSPITAL ROOM: A LOT
MOBILITY SCORE: 9
SUGGESTED CMS G CODE MODIFIER MOBILITY: CM
MOVING TO AND FROM BED TO CHAIR: UNABLE
STANDING UP FROM CHAIR USING ARMS: A LOT
TURNING FROM BACK TO SIDE WHILE IN FLAT BAD: A LOT
MOVING FROM LYING ON BACK TO SITTING ON SIDE OF FLAT BED: UNABLE
CLIMB 3 TO 5 STEPS WITH RAILING: TOTAL

## 2023-06-15 ASSESSMENT — PAIN DESCRIPTION - PAIN TYPE: TYPE: ACUTE PAIN

## 2023-06-15 ASSESSMENT — FIBROSIS 4 INDEX: FIB4 SCORE: 0.4

## 2023-06-15 ASSESSMENT — ENCOUNTER SYMPTOMS
COUGH: 1
SHORTNESS OF BREATH: 1

## 2023-06-15 NOTE — CARE PLAN
The patient is Watcher - Medium risk of patient condition declining or worsening    Shift Goals  Clinical Goals: monitor vs, respiratory  Patient Goals: sleep  Family Goals: CHRISTINE    Progress made toward(s) clinical / shift goals:  Pt on 4 liters NC, SpO2is 91%    Pt uses call bell appropriately and remains fall free during this shift.    Patient is not progressing towards the following goals:

## 2023-06-15 NOTE — PROGRESS NOTES
Hospital Medicine Daily Progress Note    Date of Service  6/15/2023    Chief Complaint  Claudine Augustine is a 67 y.o. female admitted 6/11/2023 with altered mental status    Hospital Course    Ms. Augustine is a 67 y.o. female with past medical history of hypertension, diabetes who presented 6/11/2023 with altered mental status.  Patient had a history of an ankle fracture in January 2023 that subsequently developed osteomyelitis.  She was treated with vancomycin x 6 weeks. She was recently diagnosed with UTI 2 days ago at [facility]   Ms. Augustine is a resident at [facility].  She was found to have altered mental status and low blood pressure at outside facility.  She is currently AAO x2 and able to provide some history.  Due to her altered mental status and hypotension, she was brought into the ED for further evaluation.     In the ED, patient initially hypotensive and tachycardic, however blood pressure responded to fluids.  Pertinent labs include white blood cell count 23.2, hemoglobin 8.9, hyponatremia, YARITZA.  UA positive for UTI.  CT head negative for acute intracranial abnormality.  Chest x-ray showing diffuse ill-defined interstitial opacities, right greater than left, concerning for pneumonitis.  She was admitted to [facility] for close monitoring and she was started on IV antibiotics.  Her IV antibiotics were changed from from ceftriaxone to Unasyn on June 12, 2023 due to possible pneumonia.       Interval Problem Update  6/14/2023  BP still, requiring 6 L oxygen to maintain saturations over 90  Remained asymptomatic  Patient is oriented x2  Labs reviewed, leukocytosis improving, H&H remained stable.Mild hyponatremia, procalcitonin trended down, blood culture remain negative.      Continue to monitor oxygen requirement  Continue IV antibiotics  Incentive spirometry  Repeat x-ray    Repeat BMP in a.m. to monitor electrolytes Repeat CBC in a.m. to monitor white count and hemoglobin     Patient's is critically ill given underlying  pneumonia and acute hypoxic respiratory failure.  Require close telemetry monitoring.      6/15/2023  Requiring 5 L intermittent saturation over 90  Blood pressure remained stable  Remained afebrile    Labs reviewed, worsening leukocytosis, H&H remained stable, mild hyponatremia sodium 129, procalcitonin trended down    Patient with persistent coughing.  There is risk for aspiration.  SLP following    Chest x-ray noted with pneumonia, and pulmonary edema, subsegmental atelectasis changes    Continue incentive spirometry  Add 40 mg IV Lasix    Repeat BMP in a.m. to monitor electrolytes and toxicity while on dose diuretics,   Repeat CBC in a.m. to monitor white count and hemoglobin     I have discussed this patient's plan of care and discharge plan at IDT rounds today with Case Management, Nursing, Nursing leadership, and other members of the IDT team.    Consultants/Specialty  critical care    Code Status  Full Code    Disposition  The patient is not medically cleared for discharge to home or a post-acute facility.      I have placed the appropriate orders for post-discharge needs.    Review of Systems  Review of Systems   Constitutional:  Positive for malaise/fatigue.   Respiratory:  Positive for cough and shortness of breath.         Physical Exam  Temp:  [36.4 °C (97.5 °F)-36.7 °C (98.1 °F)] 36.7 °C (98.1 °F)  Pulse:  [88-98] 97  Resp:  [16-20] 16  BP: (118-154)/(77-94) 123/80  SpO2:  [93 %-97 %] 97 %    Physical Exam  Constitutional:       Appearance: She is ill-appearing.   Eyes:      Pupils: Pupils are equal, round, and reactive to light.   Cardiovascular:      Rate and Rhythm: Normal rate and regular rhythm.   Musculoskeletal:      Right lower leg: No edema.      Left lower leg: No edema.         Fluids    Intake/Output Summary (Last 24 hours) at 6/15/2023 1353  Last data filed at 6/15/2023 1338  Gross per 24 hour   Intake 320 ml   Output 700 ml   Net -380 ml       Laboratory  Recent Labs     06/13/23  0303  06/14/23  0815 06/15/23  0423   WBC 15.4* 13.3* 15.4*   RBC 3.14* 3.07* 3.12*   HEMOGLOBIN 8.3* 8.1* 8.3*   HEMATOCRIT 25.7* 24.9* 25.8*   MCV 81.8 81.1* 82.7   MCH 26.4* 26.4* 26.6*   MCHC 32.3 32.5 32.2   RDW 47.0 46.2 46.7   PLATELETCT 483* 480* 484*   MPV 8.5* 8.4* 8.4*       Recent Labs     06/13/23  0303 06/14/23  0815 06/15/23  0423   SODIUM 134* 132* 129*   POTASSIUM 3.6 3.7 3.7   CHLORIDE 101 98 96   CO2 19* 21 20   GLUCOSE 110* 170* 168*   BUN 25* 13 15   CREATININE 1.12 0.88 1.01   CALCIUM 8.5 8.3* 8.2*                     Imaging  DX-CHEST-LIMITED (1 VIEW)   Final Result      1.  Ongoing right lung opacities which may represent pneumonia or interstitial and alveolar pulmonary edema. Similar appearance to prior exam from 6/11/2023.   2.  Subsegmental atelectatic changes left lower lobe.   3.  Interval development of left pleural effusion.      EC-ECHOCARDIOGRAM COMPLETE W/O CONT   Final Result      DX-CHEST-LIMITED (1 VIEW)   Final Result         1.  Pulmonary edema and/or infiltrates are identified, which are stable since the prior exam.   2.  Atherosclerosis      CT-HEAD W/O   Final Result      1.  No acute intracranial process.   2.  There is diffuse cerebral atrophy.   3.  There is low attenuation in the periventricular white matter most consistent with chronic small vessel ischemic change, although gliosis and demyelination could have this appearance.         DX-CHEST-PORTABLE (1 VIEW)   Final Result      1.  Diffuse ill-defined right greater than left interstitial opacities suspicious for pneumonitis favored over edema.             Assessment/Plan  * Severe sepsis (HCC)- (present on admission)  Assessment & Plan  This is Severe Sepsis Present on admission  SIRS criteria identified on my evaluation include: Fever, with temperature greater than 100.9 deg F, Tachycardia, with heart rate greater than 90 BPM and Leukocytosis, with WBC greater than 12,000  Clinical indicators of end organ dysfunction  include Systolic blood pressure (SBP) <90 mmHg or mean arterial pressure <65 mmHg  Source is pneumonia.  Sepsis protocol initiated    IV antibiotics as appropriate for source of sepsisCrystalloid Fluid Administration: Fluid resuscitation was given.  Reassessment: I have reassessed the patient's hemodynamic status        Pneumonia- (present on admission)  Assessment & Plan  Noted on chest xray and Procalcitonin is 8.3  IV Unasyn    Encephalopathy acute- (present on admission)  Assessment & Plan  Acute metabolic encephalopathy secondary to infection.  CT head is negative for acute processes.     Acute respiratory failure with hypoxia (HCC)- (present on admission)  Assessment & Plan  Secondary to pneumonia  Supplemental oxygen at 6 liters to maintain adequate sats.  Echocardiogram is normal.    DM (diabetes mellitus) ([facility])- (present on admission)  Assessment & Plan  With hyperglycemia.  Last HbA1c in December was 11.2 and now is 7.5  Continue seeing sliding scale with hypoglycemia protocol.  She had been on Lantus 28 units of Lantus prior to admission which was reduced due to poor oral intake and will be increased as tolerated.  Continue to monitor.      YARITZA (acute kidney injury) ([facility])- (present on admission)  Assessment & Plan  Consistent with dehydration in the setting of sepsis  resolved         VTE prophylaxis: SCDs/TEDs and Xarelto 10 mg daily as prophylaxis    I have performed a physical exam and reviewed and updated ROS and Plan today (6/15/2023). In review of yesterday's note (6/14/2023), there are no changes except as documented above.

## 2023-06-15 NOTE — CARE PLAN
Problem: Knowledge Deficit - Standard  Goal: Patient and family/care givers will demonstrate understanding of plan of care, disease process/condition, diagnostic tests and medications  Outcome: Progressing     Problem: Hemodynamics  Goal: Patient's hemodynamics, fluid balance and neurologic status will be stable or improve  Outcome: Progressing     Problem: Fluid Volume  Goal: Fluid volume balance will be maintained  Outcome: Progressing     Problem: Urinary - Renal Perfusion  Goal: Ability to achieve and maintain adequate renal perfusion and functioning will improve  Outcome: Progressing     Problem: Respiratory  Goal: Patient will achieve/maintain optimum respiratory ventilation and gas exchange  Outcome: Progressing     Problem: Mechanical Ventilation  Goal: Safe management of artificial airway and ventilation  Outcome: Progressing  Goal: Successful weaning off mechanical ventilator, spontaneously maintains adequate gas exchange  Outcome: Progressing  Goal: Patient will be able to express needs and understand communication  Outcome: Progressing     Problem: Physical Regulation  Goal: Diagnostic test results will improve  Outcome: Progressing  Goal: Signs and symptoms of infection will decrease  Outcome: Progressing     Problem: Skin Integrity  Goal: Skin integrity is maintained or improved  Outcome: Progressing     Problem: Fall Risk  Goal: Patient will remain free from falls  Outcome: Progressing     Problem: Pain - Standard  Goal: Alleviation of pain or a reduction in pain to the patient’s comfort goal  Outcome: Progressing   The patient is Stable - Low risk of patient condition declining or worsening    Shift Goals  Clinical Goals: mobility, safety, monitor swallowing  Patient Goals: rest  Family Goals: kendrick    Progress made toward(s) clinical / shift goals:  mobility, safety, monitor swallow, 1:1 feed    Patient is not progressing towards the following goals:

## 2023-06-15 NOTE — THERAPY
Physical Therapy   Daily Treatment     Patient Name: Claudine Augustine  Age:  67 y.o., Sex:  female  Medical Record #: [id number]  Today's Date: 6/15/2023     Precautions  Precautions: Fall Risk;Swallow Precautions    Assessment    Patient agreeable to PT tx session, somewhat perseverative on needing to have BM.  Max A required for stand pivot transfer EOB <> BSC, no improvement with FWW for UE support.  Patient reported unable to have BM on BSC (some BM in brief) however was incontinent of stool and urine on floor and in bed.  Assisted RN with rolling for pericare and with linen change.  Will continue to follow.     Plan    Treatment Plan Status: Continue Current Treatment Plan  Type of Treatment: Bed Mobility, Gait Training, Neuro Re-Education / Balance, Therapeutic Activities, Therapeutic Exercise  Treatment Frequency: 3 Times per Week  Treatment Duration: Until Therapy Goals Met    DC Equipment Recommendations: Unable to determine at this time  Discharge Recommendations: Recommend post-acute placement for additional physical therapy services prior to discharge home     Objective     06/15/23 1137   Precautions   Precautions Fall Risk;Swallow Precautions   Pain 0 - 10 Group   Therapist Pain Assessment Post Activity Pain Same as Prior to Activity;Nurse Notified   Cognition    Cognition / Consciousness WDL   Level of Consciousness Alert   Comments Pleasant & cooperative, somewhat perseverative on needing to have BM   Balance   Sitting Balance (Static) Poor -   Sitting Balance (Dynamic) Poor -   Standing Balance (Static) Trace +   Standing Balance (Dynamic) Trace +   Weight Shift Sitting Poor   Weight Shift Standing Poor   Skilled Intervention Verbal Cuing;Tactile Cuing   Bed Mobility    Supine to Sit Moderate Assist   Sit to Supine Moderate Assist   Scooting Maximal Assist   Rolling Minimal Assist to Rt.;Minimum Assist to Lt.   Skilled Intervention Verbal Cuing;Tactile Cuing;Facilitation   Comments HOB elevated   Gait  Analysis   Gait Level Of Assist Unable to Participate   Functional Mobility   Sit to Stand Maximal Assist   Bed, Chair, Wheelchair Transfer Maximal Assist   Toilet Transfers Maximal Assist   Transfer Method Stand Pivot   Mobility bed <> [facility]   Skilled Intervention Verbal Cuing;Tactile Cuing;Facilitation   Activity Tolerance   Sitting in Chair 8 min + on BSC   Sitting Edge of Bed 1 min   Standing 1-2 min total   Short Term Goals    Short Term Goal # 1 Pt will be able to perform bed mobility with min A to improve functional  mobility in 6 visits.   Goal Outcome # 1 goal not met   Short Term Goal # 2 Pt will be able to perform functional transfers with LRAD with min A to improve function in 6 visits.   Goal Outcome # 2 Goal not met   Short Term Goal # 3 Pt will be able to ambulate 50 ft with LRAD with min A in 6 visits to improve functional mobility.   Goal Outcome # 3 Goal not met   Physical Therapy Treatment Plan   Physical Therapy Treatment Plan Continue Current Treatment Plan

## 2023-06-16 VITALS
SYSTOLIC BLOOD PRESSURE: 139 MMHG | TEMPERATURE: 97.7 F | HEIGHT: 68 IN | DIASTOLIC BLOOD PRESSURE: 71 MMHG | OXYGEN SATURATION: 91 % | WEIGHT: 166.45 LBS | RESPIRATION RATE: 16 BRPM | BODY MASS INDEX: 25.23 KG/M2 | HEART RATE: 93 BPM

## 2023-06-16 LAB
BACTERIA BLD CULT: NORMAL
BACTERIA BLD CULT: NORMAL
BASOPHILS # BLD AUTO: 0.8 % (ref 0–1.8)
BASOPHILS # BLD: 0.13 K/UL (ref 0–0.12)
EOSINOPHIL # BLD AUTO: 0.13 K/UL (ref 0–0.51)
EOSINOPHIL NFR BLD: 0.8 % (ref 0–6.9)
ERYTHROCYTE [DISTWIDTH] IN BLOOD BY AUTOMATED COUNT: 46.7 FL (ref 35.9–50)
GLUCOSE BLD STRIP.AUTO-MCNC: 199 MG/DL (ref 65–99)
GLUCOSE BLD STRIP.AUTO-MCNC: 282 MG/DL (ref 65–99)
HCT VFR BLD AUTO: 27.1 % (ref 37–47)
HGB BLD-MCNC: 8.6 G/DL (ref 12–16)
LYMPHOCYTES # BLD AUTO: 1.97 K/UL (ref 1–4.8)
LYMPHOCYTES NFR BLD: 12 % (ref 22–41)
MANUAL DIFF BLD: NORMAL
MCH RBC QN AUTO: 26.2 PG (ref 27–33)
MCHC RBC AUTO-ENTMCNC: 31.7 G/DL (ref 32.2–35.5)
MCV RBC AUTO: 82.6 FL (ref 81.4–97.8)
MICROCYTES BLD QL SMEAR: ABNORMAL
MONOCYTES # BLD AUTO: 1.26 K/UL (ref 0–0.85)
MONOCYTES NFR BLD AUTO: 7.7 % (ref 0–13.4)
MORPHOLOGY BLD-IMP: NORMAL
MYELOCYTES NFR BLD MANUAL: 0.9 %
NEUTROPHILS # BLD AUTO: 12.76 K/UL (ref 1.82–7.42)
NEUTROPHILS NFR BLD: 77.8 % (ref 44–72)
NRBC # BLD AUTO: 0 K/UL
NRBC BLD-RTO: 0 /100 WBC (ref 0–0.2)
PLATELET # BLD AUTO: 558 K/UL (ref 164–446)
PLATELET BLD QL SMEAR: NORMAL
PMV BLD AUTO: 8.4 FL (ref 9–12.9)
PROCALCITONIN SERPL-MCNC: 1.54 NG/ML
RBC # BLD AUTO: 3.28 M/UL (ref 4.2–5.4)
RBC BLD AUTO: PRESENT
SIGNIFICANT IND 70042: NORMAL
SIGNIFICANT IND 70042: NORMAL
SITE SITE: NORMAL
SITE SITE: NORMAL
SOURCE SOURCE: NORMAL
SOURCE SOURCE: NORMAL
WBC # BLD AUTO: 16.4 K/UL (ref 4.8–10.8)

## 2023-06-16 PROCEDURE — 85007 BL SMEAR W/DIFF WBC COUNT: CPT

## 2023-06-16 PROCEDURE — 84145 PROCALCITONIN (PCT): CPT

## 2023-06-16 PROCEDURE — A9270 NON-COVERED ITEM OR SERVICE: HCPCS | Performed by: STUDENT IN AN ORGANIZED HEALTH CARE EDUCATION/TRAINING PROGRAM

## 2023-06-16 PROCEDURE — 82962 GLUCOSE BLOOD TEST: CPT

## 2023-06-16 PROCEDURE — 85025 COMPLETE CBC W/AUTO DIFF WBC: CPT

## 2023-06-16 PROCEDURE — 99239 HOSP IP/OBS DSCHRG MGMT >30: CPT | Performed by: STUDENT IN AN ORGANIZED HEALTH CARE EDUCATION/TRAINING PROGRAM

## 2023-06-16 PROCEDURE — 36415 COLL VENOUS BLD VENIPUNCTURE: CPT

## 2023-06-16 PROCEDURE — 700102 HCHG RX REV CODE 250 W/ 637 OVERRIDE(OP): Performed by: STUDENT IN AN ORGANIZED HEALTH CARE EDUCATION/TRAINING PROGRAM

## 2023-06-16 PROCEDURE — 92612 ENDOSCOPY SWALLOW (FEES) VID: CPT

## 2023-06-16 RX ORDER — INSULIN GLARGINE 100 [IU]/ML
20 INJECTION, SOLUTION SUBCUTANEOUS EVERY EVENING
Qty: 6 ML | Refills: 0 | Status: ACTIVE | OUTPATIENT
Start: 2023-06-16 | End: 2023-07-16

## 2023-06-16 RX ORDER — AMOXICILLIN AND CLAVULANATE POTASSIUM 875; 125 MG/1; MG/1
1 TABLET, FILM COATED ORAL EVERY 12 HOURS
Qty: 8 TABLET | Refills: 0 | Status: ACTIVE | OUTPATIENT
Start: 2023-06-16 | End: 2023-06-20

## 2023-06-16 RX ADMIN — AMOXICILLIN AND CLAVULANATE POTASSIUM 1 TABLET: 875; 125 TABLET, FILM COATED ORAL at 05:42

## 2023-06-16 RX ADMIN — BUPROPION HYDROCHLORIDE 150 MG: 75 TABLET, FILM COATED ORAL at 05:42

## 2023-06-16 RX ADMIN — INSULIN LISPRO 3 UNITS: 100 INJECTION, SOLUTION INTRAVENOUS; SUBCUTANEOUS at 12:07

## 2023-06-16 RX ADMIN — INSULIN LISPRO 1 UNITS: 100 INJECTION, SOLUTION INTRAVENOUS; SUBCUTANEOUS at 07:55

## 2023-06-16 RX ADMIN — INSULIN LISPRO 6 UNITS: 100 INJECTION, SOLUTION INTRAVENOUS; SUBCUTANEOUS at 12:07

## 2023-06-16 RX ADMIN — INSULIN LISPRO 6 UNITS: 100 INJECTION, SOLUTION INTRAVENOUS; SUBCUTANEOUS at 07:55

## 2023-06-16 NOTE — DISCHARGE INSTRUCTIONS
Diet    Diabetic Diet     A Diabetic Diet can help you control your blood glucose, lower your risk of heart disease, improve your blood pressure and maintain a healthy weight.  Eating healthy foods, low in calories, fat and carbohydrates at the same time every day can help control your blood glucose. Carbohydrates can greatly affect your blood glucose levels.  Counting carbs is important to keep your blood glucose at a healthy level, especially if you use insulin or take certain oral diabetes medicines.  Avoid alcohol as it can cause a sudden decrease in blood glucose (hypoglycemia), especially if you use insulin or take certain oral diabetes medicines.    Level 6 Soft and Bite sized- Meds ok  Thin liquids- No straws    Activity    Resume Your Normal Activity    You may resume your normal activity as tolerated.  Rest as needed.

## 2023-06-16 NOTE — THERAPY
Speech Language Pathology   Flexible Endoscopic Evaluation of Swallowing (FEES)        Patient Name: Claudine Augustine  AGE:  67 y.o., SEX:  female  Medical Record #: [id number]  Date of Service: 6/16/2023      History of Present Illness  68 y/o female presetned 6/11 from [facility] for low blood pressure, AMS, and concern for sepsis. Recently dx with UTI at [facility]. Hypoxic in the ED.     CXR 6/14:  1.  Ongoing right lung opacities which may represent pneumonia or interstitial and alveolar pulmonary edema. Similar appearance to prior exam from 6/11/2023.  2.  Subsegmental atelectatic changes left lower lobe.  3.  Interval development of left pleural effusion.      Pertinent Information  Current Method of Nutrition: Oral diet (MM5/MT2)  Patient Behaviors: Flat Affect   Dentition: Fair, Natural dentition   Feeding Tube: None   Tracheostomy: No                       Factor(s) Affecting Performance: None     Discussed the risks, benefits, and alternatives of the FEES procedure. Patient/family acknowledged and agreed to proceed.      Assessment  Flexible Endoscopic Evaluation of Swallowing (FEES) completed at bedside today. The endoscope was passed transnasally via Right nare to evaluate the anatomy and physiology of swallowing. Pt tolerated the procedure with no apparent distress.    Anatomic Findings: WFL  Vocal Fold Motion: Bilateral movement  Secretion Management: Adequate  PO Trials: Thin Liquid, Ice Chips, Soft & Bite Sized, Regular Solid, Mixed      Consistency PAS Score Timing Residue Comments   Thin Liquid 3 During swallow Vallecular Residue: Trace (1%-5%)  Pyriform Sinus Residue: Trace (1%-5%) PAS 1: tsp x1, cup x1, single straw x3  PAS 3: tsp x1, cup x3, single straw x1, consecutive straw x1   Soft & Bite Sized 1 N/A Vallecular Residue: None (0%)  Pyriform Sinus Residue: None (0%)    Regular Solid 1 N/A Vallecular Residue: Moderate (25%-50%)  Pyriform Sinus Residue: None (0%)    Mixed 1 N/A Vallecular Residue: None  (0%)  Pyriform Sinus Residue: None (0%)      Penetration-Aspiration Scale (PAS)  1     No contrast enters airway  2     Contrast enters the airway, remains above the vocal folds, and is ejected from the airway (not seen in the airway at the end of the swallow).  3     Contrast enters the airway, remains above the vocal folds, and is not ejected from the airway (is seen in the airway after the swallow).  4     Contrast enters the airway, contacts the vocal folds, and is ejected from the airway.  5     Contrast enters the airway, contacts the vocal folds, and is not ejected from the airway  6     Contrast enters the airway, crosses the plane of the vocal folds, and is ejected from the airway.  7     Contrast enters the airway, crosses the plane of the vocal folds, and is not ejected from the airway despite effort.  8     Contrast enters the airway, crosses the plane of the vocal folds, is not ejected from the airway and there is no response to aspiration.      Oral phase: Unremarkable.       Pharyngeal phase: Mistimed laryngeal vestibule closure resulted in inconsistent trace, high laryngeal penetration suspected during the swallow with trials of thin liquids via tsp, cup, and straw. Rate of laryngeal penetration was reduced when pt took single sips of thins from the straw; however, if pt took consecutive sips of thins from the straw, residue increased to trace-min amounts.       Compensatory Strategies:  A cued throat clear eliminated residue in laryngeal vestibule.   A liquid wash eliminated vallecular residue following dry solids.     Severity Rating:  CASA: Mild      Clinical Impressions  The pt presents with a mild oropharyngeal dysphagia (improved from last FEES) suspect acute on subacute related to severe sepsis, respiratory failure, and reported ongoing changes to swallow function over the past 3-5 months. Swallow safety is mildly impaired while swallow efficiency is relatively intact. Anticipate progression  "to regular diet in the following days. Would recommend GI consult with ongoing c/o difficulty swallow to rule out esophageal dysfunction - pt reported emesis and cough for the past 3-4 months and cannot rule out ascending aspiration.       Recommendations  Diet Consistency: Soft & bite size and thin liquid diet  Medication: Whole with liquid, As tolerated  Supervision: Close supervision - patient may be left alone for less than 5 minutes at a time  Positioning: Fully upright and midline during oral intake  Strategies: Slow rate of intake, Small bites/sips, No straws (unless pt takes one sip at a time)  Oral Care: Pre- and post-meals  Additional Instrumentation: None         SLP Treatment Plan  Treatment Plan: Dysphagia Treatment, Patient/Family/Caregiver Training  SLP Frequency: 3x Per Week  Estimated Duration: Until Therapy Goals Met      Anticipated Discharge Needs  Discharge Recommendations: Recommend home health for continued speech therapy services   Therapy Recommendations Upon DC: Dysphagia Training, Patient / Family / Caregiver Education       Patient / Family Goals  Patient / Family Goal #1: \"Do I get to eat?\"  Goal #1 Outcome: Progressing as expected  Short Term Goal # 1: Pt will participate in FEES with SLP to further evaluate swallow function and inform POC.  Goal Outcome # 1: Goal met, new goal added  Short Term Goal # 1 B : Pt will consume diet of MM5/MT2 given strategy use with no overt s/sx of aspiration or worsening of lung status.  Goal Outcome  # 1 B: Goal met  Short Term Goal # 2: Pt will complete exercises targeting BOT retraction, LVC, epiglottic inversion, pharyngeal shortening, and pharyngeal constriction x20 in a session given mod cues from SLP with \"good\" accuracy.  Goal Outcome # 2 : Progressing as expected  Short Term Goal # 3: NEW 6/16/23: Patient will consume a SB6/TN0 diet with no overt s/sx of aspiration given min cues for swallow precautions.      Flores Nair, SLP  "

## 2023-06-16 NOTE — DISCHARGE PLANNING
Agency[facility]Facility Name: [facility]  Spoke To: [place]  Outcome: SNF informed DPA that they've received auth and can accept Pt for resumption of care.     RN CM notified.     1026-  Agency/Facility Name: [facility]  Spoke To: [place]  Outcome: DPA informed SNF that Pt is MC for DC. SNF to see if they have transport available for later afternoon transport.     RN CM notified.     1243-  Agency/Facility Name: [facility]  Spoke To: [place]  Outcome: DPA left v-mail requesting update on transport time.     RN CM notified.     1252-  Agency[facility]Facility Name: [facility]  Spoke To: [place]  Outcome: SNF confirmed they have arranged transport through [facility] for 1600 pickup.  SNF asking for DC Summary to be in by 1430.     1406-  Agency/Facility Name: [facility]  Outcome: DPA informed SNF that DC Summary is in.

## 2023-06-16 NOTE — CARE PLAN
The patient is Stable - Low risk of patient condition declining or worsening    Shift Goals  Clinical Goals: 1-1 feed, safety  Patient Goals: comfort, soup  Family Goals: CHRISTINE    Progress made toward(s) clinical / shift goals:  Pt has received their snack and water mildly thick with a 1-1 feed, pt has remained free from falls on this admit, pt's O2 requirements have remained stable at 4 L this shift    Patient is not progressing towards the following goals: Pt's cough is very congested      Problem: Respiratory  Goal: Patient will achieve/maintain optimum respiratory ventilation and gas exchange  Outcome: Progressing     Problem: Fall Risk  Goal: Patient will remain free from falls  Outcome: Progressing

## 2023-06-16 NOTE — PROGRESS NOTES
Assumed care of patient after receiving report from [place], night shift RN. Patient is currently resting comfortably in bed.

## 2023-06-16 NOTE — DISCHARGE PLANNING
Case Management Discharge Planning    Admission Date: 6/11/2023  GMLOS: 5  ALOS: 5    6-Clicks ADL Score: 20  6-Clicks Mobility Score: 9  PT and/or OT Eval ordered: Yes  Post-acute Referrals Ordered: No  Post-acute Choice Obtained: No  Has referral(s) been sent to post-acute provider:  No      Anticipated Discharge Dispo: Discharge Disposition: Discharged to home/self care (01) ([facility])    DME Needed: No    Action(s) Taken: Updated Provider/Nurse on Discharge Plan    Escalations Completed: None    Medically Clear: Yes    Next Steps: Pt is medically cleared today. Pt is a resident at [facility] and they are able to accept this patient back. Appreciate DPA reaching out to facility to coordinate transport. Awaiting update on time for transport home today.    Barriers to Discharge: Transportation    Is the patient up for discharge tomorrow: No- DC today

## 2023-06-16 NOTE — DISCHARGE PLANNING
Case Management Discharge Planning    Admission Date: 6/11/2023  GMLOS: 5  ALOS: 5    6-Clicks ADL Score: 20  6-Clicks Mobility Score: 9  PT and/or OT Eval ordered: Yes  Post-acute Referrals Ordered: Yes  Post-acute Choice Obtained: Yes  Has referral(s) been sent to post-acute provider:  Yes      Anticipated Discharge Dispo: Discharge Disposition: Discharged to home/self care (01) ([facility])    DME Needed: No    Action(s) Taken: Patient Conference, DC Assessment Complete (See below), Choice obtained, and Acceptance Received    Escalations Completed: None    Medically Clear: Yes    Next Steps: Pt cleared to return to [facility] following f/u FEES for updated SLP recommendations. [facility] set transport time of 1600. Updated MD and BS RN.     Barriers to Discharge: None    Is the patient up for discharge tomorrow: No- DC today at 1600

## 2023-06-16 NOTE — PROGRESS NOTES
Bedside report received from GUADALUPE Moise. Pt on 4 L NC. Patient A&O x 3, disoriented to time. Pt does not complain of pain at this time. POC discussed with patient. Patient verbalizes understanding. Call light and belongings within reach. Bed locked in lowest position, alarm and fall precautions in place.

## 2023-06-16 NOTE — DISCHARGE SUMMARY
Discharge Summary    CHIEF COMPLAINT ON ADMISSION  Chief Complaint   Patient presents with    UTI     From [facility] with symptoms of UTI not currently on antibiotics, dx with UTI at [facility] x2 days ago, hx chronic UTI    Hypotension     86/54 with EMS, 300 LR given en route    ALOC     Aox2 currently, baseline Aox4        Reason for Admission  EMS    Admission Date  6/11/2023     CODE STATUS  Full Code    HPI & HOSPITAL COURSE    Ms. Augustine is a 67 y.o. female with past medical history of hypertension, diabetes who presented 6/11/2023 with altered mental status.  Patient had a history of an ankle fracture in January 2023 that subsequently developed osteomyelitis.  She was treated with vancomycin x 6 weeks. She was recently diagnosed with UTI 2 days ago at [facility].   Ms. Augustine is a resident at [facility].  She was found to have altered mental status and low blood pressure at outside facility.  She is currently AAO x2 and able to provide some history.  Due to her altered mental status and hypotension, she was brought into the ED for further evaluation.     In the ED, patient initially hypotensive and tachycardic, however blood pressure responded to fluids.  Pertinent labs include white blood cell count 23.2, hemoglobin 8.9, hyponatremia, YARITZA.  UA positive for UTI.  CT head negative for acute intracranial abnormality.  Chest x-ray showing diffuse ill-defined interstitial opacities, right greater than left, concerning for pneumonitis.  She was admitted to IMCU for close monitoring and she was started on IV antibiotics.  Her IV antibiotics were changed from from ceftriaxone to Unasyn on June 12, 2023 due to possible pneumonia.     During hospitalization her oxygen requirement decreased to 2 L.  Patient does have persistent leukocytosis despite downtrending procalcitonin.  We will continue on additional 4 days of antibiotics for aspiration pneumonia to complete 10 days course.  There is no concern of ongoing active  infection.  Blood culture remain negative.  I have recommended repeat CBC in 1 week.  Referral in.  Fess  test noted with mild oropharyngeal dysphagia    At the time of discharge patient remain  hemodynamically stable ,asymptomatic ,labs remain unremarkable .  Patient will be discharged with close follow up with PCP .Discharge plan was discussed with patient in details .  Patient agreed with discharge plan  and  all questions answered.        Therefore, she is discharged in good and stable condition to skilled nursing facility.    The patient met 2-midnight criteria for an inpatient stay at the time of discharge.          DISCHARGE DIAGNOSES  Principal Problem:    Severe sepsis (HCC) (POA: Yes)  Active Problems:    YARITZA (acute kidney injury) (HCC) (POA: Yes)    DM (diabetes mellitus) (HCC) (POA: Yes)    Acute respiratory failure with hypoxia (HCC) (POA: Yes)    Encephalopathy acute (POA: Yes)    Pneumonia (POA: Yes)  Resolved Problems:    ACP (advance care planning) (POA: Unknown)      FOLLOW UP  No future appointments.  No follow-up provider specified.      Repeat CBC in 1 week to monitor white count    MEDICATIONS ON DISCHARGE     Medication List        START taking these medications        Instructions   amoxicillin-clavulanate 875-125 MG Tabs  Commonly known as: AUGMENTIN   Take 1 Tablet by mouth every 12 hours for 4 days.  Dose: 1 Tablet     Lantus SoloStar 100 UNIT/ML Sopn injection  Generic drug: insulin glargine  Replaces: insulin glargine 100 UNIT/ML Soln   Inject 20 Units under the skin every evening for 30 days.  Dose: 20 Units            CONTINUE taking these medications        Instructions   acetaminophen 325 MG Tabs  Commonly known as: Tylenol   Take 650 mg by mouth every four hours as needed for Mild Pain. 2 tablets = 650 mg.  Dose: 650 mg     amLODIPine 5 MG Tabs  Commonly known as: NORVASC   Take 5 mg by mouth every day.  Dose: 5 mg     heparin 5000 UNIT/ML Soln   Inject 5,000 Units as directed every  8 hours.  Dose: 5,000 Units     insulin aspart 100 UNIT/ML Soln  Commonly known as: NovoLOG   Inject 2-12 Units under the skin 4 Times a Day,Before Meals and at Bedtime. Inject per sliding scale. For blood glucose:  200 - 250 = 2 units  251 - 300 = 4 units  301 - 350 = 6 units  351 - 400 = 8 units  401 - 450 = 10 units  451 - 500 = 12 units  If >500 or <60, call M.D.  Dose: 2-12 Units     melatonin 3 MG Tabs   Take 3 mg by mouth at bedtime.  Dose: 3 mg     metFORMIN 500 MG Tabs  Commonly known as: GLUCOPHAGE   Take 500 mg by mouth every day.  Dose: 500 mg     sennosides 8.6 MG Tabs  Commonly known as: SENOKOT   Take 8.6 mg by mouth 2 times a day. Hold for loose stool.  Dose: 8.6 mg            STOP taking these medications      buPROPion 75 MG Tabs  Commonly known as: WELLBUTRIN     cefdinir 300 MG Caps  Commonly known as: OMNICEF     insulin glargine 100 UNIT/ML Soln  Commonly known as: Lantus  Replaced by: Lantus SoloStar 100 UNIT/ML Sopn injection     losartan 100 MG Tabs  Commonly known as: COZAAR     sulfamethoxazole-trimethoprim 800-160 MG tablet  Commonly known as: BACTRIM DS            ASK your doctor about these medications        Instructions   omeprazole 20 MG delayed-release capsule  Commonly known as: PRILOSEC   Take 20 mg by mouth every day.  Dose: 20 mg              Allergies  No Known Allergies    DIET  Orders Placed This Encounter   Procedures    Diet Order Diet: Level 6 - Soft and Bite Sized (meds as tolerated); Liquid level: Level 0 - Thin; Tray Modifications (optional): No Straws     Standing Status:   Standing     Number of Occurrences:   1     Order Specific Question:   Diet:     Answer:   Level 6 - Soft and Bite Sized [23]     Comments:   meds as tolerated     Order Specific Question:   Liquid level     Answer:   Level 0 - Thin     Order Specific Question:   Tray Modifications (optional)     Answer:   No Straws       ACTIVITY  As tolerated.  Weight bearing as tolerated    LINES, DRAINS, AND  WOUNDS  This is an automated list. Peripheral IVs will be removed prior to discharge.  Peripheral IV 06/11/23 18 G Anterior;Right Forearm (Active)   Site Assessment Clean;Dry;Intact 06/16/23 0900   Dressing Type Transparent 06/16/23 0900   Line Status Scrubbed the hub prior to access;Saline locked;No blood return 06/16/23 0900   Dressing Status Clean;Dry;Intact 06/16/23 0900   Dressing Intervention N/A 06/15/23 2300   Dressing Change Due 06/17/23 06/13/23 0800   Infiltration Grading ([facility]) 0 06/16/23 0900   Phlebitis Scale ([facility] Only) 0 06/16/23 0900     External Urinary Catheter (Female Wick) (Active)   Collection Container Suction container 06/12/23 2000   Suction Level (Female Wick Catheter) -20 mmHg 06/12/23 2000   Output (mL) 700 mL 06/14/23 2358         Peripheral IV 06/11/23 18 G Anterior;Right Forearm (Active)   Site Assessment Clean;Dry;Intact 06/16/23 0900   Dressing Type Transparent 06/16/23 0900   Line Status Scrubbed the hub prior to access;Saline locked;No blood return 06/16/23 0900   Dressing Status Clean;Dry;Intact 06/16/23 0900   Dressing Intervention N/A 06/15/23 2300   Dressing Change Due 06/17/23 06/13/23 0800   Infiltration Grading ([facility]) 0 06/16/23 0900   Phlebitis Scale ([facility] Only) 0 06/16/23 0900                 LABORATORY  Lab Results   Component Value Date    SODIUM 132 (L) 06/15/2023    POTASSIUM 3.9 06/15/2023    CHLORIDE 97 06/15/2023    CO2 21 06/15/2023    GLUCOSE 291 (H) 06/15/2023    BUN 17 06/15/2023    CREATININE 1.05 06/15/2023        Lab Results   Component Value Date    WBC 16.4 (H) 06/16/2023    HEMOGLOBIN 8.6 (L) 06/16/2023    HEMATOCRIT 27.1 (L) 06/16/2023    PLATELETCT 558 (H) 06/16/2023        Total time of the discharge process exceeds 38 minutes.

## 2023-06-16 NOTE — DOCUMENTATION QUERY
[facility]                                                                       Query Response Note      PATIENT:               MODESTA GONSALEZ  ACCT #:                  [id number]  MRN:                     [id number]  :                      1955  ADMIT DATE:       2023 5:31 PM  DISCH DATE:          RESPONDING  PROVIDER #:        [id number]           QUERY TEXT:    Per RD evaluation on , patient meets ASPEN criteria for severe malnutrition in chronic illness AEB  eating 50% of meals for 3 months and 16.2% weight loss in 6 months.    Based upon your judgment and the above clinical indicators, please select the most appropriate diagnosis for the findings.      The patient's clinical indicators include:  68 yo F admitted with Sepsis and PNA    Clinical Indicators: American Society for Parenteral and Enteral Nutrition (ASPEN) criteria (presence of at least two)  Per dietary consult dated :     - 50% of meals for 3 months  -16.2% weight loss in 6 months.  - trace/dependent edema     Risk Factors: Nausea/Vomiting for 3 months, dehydration, IDDM, Sepsis    Treatment: RD consult; document oral intake; monitor wt.; nutrition rep, magic cups with meals to promote adequate kcal and protein intake., advance diet per SLP      Contact me with any questions.    Thank you for your time and attention,  Ligia Perez RN, [email or web address]  Connect via email, Voalte or messenger.  Options provided:   -- Severe malnutrition   -- Moderate malnutrition   -- Mild malnutrition   -- Insignificant finding/no effect on patient stay and treatment   -- Other explanation, (please specify other explanation)   -- Unable to determine      Query created by: Ligia Perez on 6/15/2023 12:40 PM    RESPONSE TEXT:    Severe malnutrition          Electronically signed by:  ANDREIA MARTELL MD 2023 9:01 AM